# Patient Record
Sex: MALE | Race: BLACK OR AFRICAN AMERICAN | Employment: UNEMPLOYED | URBAN - METROPOLITAN AREA
[De-identification: names, ages, dates, MRNs, and addresses within clinical notes are randomized per-mention and may not be internally consistent; named-entity substitution may affect disease eponyms.]

---

## 2018-08-26 ENCOUNTER — HOSPITAL ENCOUNTER (EMERGENCY)
Age: 18
Discharge: HOME | End: 2018-08-26
Attending: EMERGENCY MEDICINE
Payer: COMMERCIAL

## 2018-08-26 VITALS
RESPIRATION RATE: 16 BRPM | DIASTOLIC BLOOD PRESSURE: 66 MMHG | SYSTOLIC BLOOD PRESSURE: 108 MMHG | TEMPERATURE: 98.3 F | HEART RATE: 75 BPM | OXYGEN SATURATION: 98 %

## 2018-08-26 DIAGNOSIS — R56.9 SEIZURE (HCC): Primary | ICD-10-CM

## 2018-08-26 LAB
CALCIUM IONIZED: 1.21 MMOL/L (ref 1.12–1.32)
CO2: 26 MMOL/L (ref 21–32)
GFR AFRICAN AMERICAN: >60
GFR NON-AFRICAN AMERICAN: >60
GLUCOSE BLD-MCNC: 91 MG/DL (ref 70–99)
LACTATE: 2.93 MMOL/L (ref 0.4–2)
PERFORMED ON: ABNORMAL
PERFORMED ON: ABNORMAL
POC ANION GAP: 11 (ref 10–20)
POC BUN: 22 MG/DL (ref 7–18)
POC CHLORIDE: 106 MMOL/L (ref 99–110)
POC CREATININE: 1.2 MG/DL (ref 0.9–1.3)
POC POTASSIUM: 4.1 MMOL/L (ref 3.5–5.1)
POC SAMPLE TYPE: ABNORMAL
POC SAMPLE TYPE: ABNORMAL
POC SODIUM: 143 MMOL/L (ref 136–145)

## 2018-08-26 PROCEDURE — 96360 HYDRATION IV INFUSION INIT: CPT

## 2018-08-26 PROCEDURE — 2580000003 HC RX 258: Performed by: EMERGENCY MEDICINE

## 2018-08-26 PROCEDURE — 99284 EMERGENCY DEPT VISIT MOD MDM: CPT

## 2018-08-26 PROCEDURE — 83605 ASSAY OF LACTIC ACID: CPT

## 2018-08-26 PROCEDURE — 80047 BASIC METABLC PNL IONIZED CA: CPT

## 2018-08-26 RX ORDER — 0.9 % SODIUM CHLORIDE 0.9 %
1000 INTRAVENOUS SOLUTION INTRAVENOUS ONCE
Status: COMPLETED | OUTPATIENT
Start: 2018-08-26 | End: 2018-08-26

## 2018-08-26 RX ADMIN — SODIUM CHLORIDE 1000 ML: 9 INJECTION, SOLUTION INTRAVENOUS at 18:51

## 2018-08-26 NOTE — ED PROVIDER NOTES
[address] ENCOUNTER          ATTENDING PHYSICIAN NOTE       Date of evaluation: 8/26/2018    Chief Complaint     Seizures (squad reports that bystanders reported that patient had a seizure for 20 minutes/pt was a local rec center/ Pt unable to tell us his birthday/age/history)      History of Present Illness     Soledad Colón is a 19 y.o. male with a history of seizures who presents after apparently having a seizure [place] the rec center. Bystanders apparently reported that it went on for about 20 minutes and the patient here is unable to give us any history and refuses to give any information. Review of Systems     Review of Systems   Unable to perform ROS: Patient nonverbal       Past Medical, Surgical, Family, and Social History     He has no past medical history on file. He has no past surgical history on file. His family history is not on file. He     Medications     Previous Medications    No medications on file       Allergies     He has No Known Allergies. Physical Exam     INITIAL VITALS: BP: 108/66, Temp: 98.3 °F (36.8 °C), Pulse: 75, Resp: 16, SpO2: 98 %   Physical Exam   Constitutional: He appears well-developed and well-nourished. No distress. Eyes: Pupils are equal, round, and reactive to light. EOM are normal.   Cardiovascular: Normal rate and regular rhythm. Pulmonary/Chest: Effort normal and breath sounds normal. He has no rales. Abdominal: Soft. Bowel sounds are normal. He exhibits no distension. There is no tenderness. Neurological:   Somnolent/post-ictal   Skin: Skin is warm and dry. He is not diaphoretic. Nursing note and vitals reviewed.       Diagnostic Results     LABS:   Results for orders placed or performed during the hospital encounter of 08/26/18   POCT Venous   Result Value Ref Range    POC Sodium 143 136 - 145 mmol/L    POC Potassium 4.1 3.5 - 5.1 mmol/L    POC Chloride 106 99 - 110 mmol/L    CO2 26 21 - 32 mmol/L    POC Anion Gap 11 10 - 20    POC Glucose 91 70 - 99 mg/dl    POC BUN 22 (H) 7 - 18 mg/dL    POC Creatinine 1.2 0.9 - 1.3 mg/dL    GFR Non-African American >60 >60    GFR African American >60     Calcium, Ion 1.21 1.12 - 1.32 mmol/L    Sample Type STEVIE     Performed on SEE BELOW    POCT Venous   Result Value Ref Range    Lactate 2.93 (H) 0.40 - 2.00 mmol/L    Sample Type STEVIE     Performed on SEE BELOW        RECENT VITALS:  BP: 108/66, Temp: 98.3 °F (36.8 °C), Pulse: 75, Resp: 16, SpO2: 98 %       ED Course     Nursing Notes, Past Medical Hx, Past Surgical Hx, Social Hx, Allergies, and Family Hx were reviewed. The patient was given the following medications:  Orders Placed This Encounter   Medications    0.9 % sodium chloride bolus       CONSULTS:  None    MEDICAL Von Martin / JONE / Bhupinder Malissa is a 19 y.o. male Presenting after a seizure. He finally awakened after being postictal for a period of time and was answering all questions appropriately and back to baseline and feeling well with no complaints. Family arrived to show us what medications he was on and reports that he has been compliantly think he was in the heat today and that sort may have set off his seizure. He has an appointment coming up with his neurologist in October and I have advised that he keep without appointment.     Clinical Impression     Seizure    Disposition     PATIENT REFERRED TO:  PCP    DISCHARGE MEDICATIONS:  New Prescriptions    No medications on file       [address] [facility] MD Amando  08/26/18 [id number]

## 2018-08-27 NOTE — ED NOTES
Peripheral IV removed without complication. Bleeding controlled and bandage applied. Patient tolerated well.       Casper Hammer RN  08/26/18 [id number]

## 2019-06-14 ENCOUNTER — APPOINTMENT (OUTPATIENT)
Dept: CT IMAGING | Age: 19
End: 2019-06-14
Payer: COMMERCIAL

## 2019-06-14 ENCOUNTER — HOSPITAL ENCOUNTER (EMERGENCY)
Age: 19
Discharge: HOME | End: 2019-06-15
Attending: EMERGENCY MEDICINE
Payer: COMMERCIAL

## 2019-06-14 VITALS
DIASTOLIC BLOOD PRESSURE: 75 MMHG | RESPIRATION RATE: 15 BRPM | WEIGHT: 166 LBS | TEMPERATURE: 98 F | OXYGEN SATURATION: 100 % | HEART RATE: 61 BPM | SYSTOLIC BLOOD PRESSURE: 110 MMHG

## 2019-06-14 DIAGNOSIS — R56.9 SEIZURE (HCC): Primary | ICD-10-CM

## 2019-06-14 DIAGNOSIS — R68.89: ICD-10-CM

## 2019-06-14 DIAGNOSIS — Z79.899 ON VALPROIC ACID THERAPY: ICD-10-CM

## 2019-06-14 LAB
AMPHETAMINE SCREEN, URINE: NORMAL
ANION GAP SERPL CALCULATED.3IONS-SCNC: 13 MMOL/L (ref 3–16)
BARBITURATE SCREEN URINE: NORMAL
BENZODIAZEPINE SCREEN, URINE: NORMAL
BUN BLDV-MCNC: 21 MG/DL (ref 7–20)
CALCIUM SERPL-MCNC: 9.8 MG/DL (ref 8.3–10.6)
CANNABINOID SCREEN URINE: NORMAL
CHLORIDE BLD-SCNC: 104 MMOL/L (ref 99–110)
CO2: 25 MMOL/L (ref 21–32)
COCAINE METABOLITE SCREEN URINE: NORMAL
CREAT SERPL-MCNC: 0.9 MG/DL (ref 0.9–1.3)
GFR AFRICAN AMERICAN: >60
GFR NON-AFRICAN AMERICAN: >60
GLUCOSE BLD-MCNC: 97 MG/DL (ref 70–99)
Lab: NORMAL
METHADONE SCREEN, URINE: NORMAL
OPIATE SCREEN URINE: NORMAL
OXYCODONE URINE: NORMAL
PH UA: 7
PHENCYCLIDINE SCREEN URINE: NORMAL
POTASSIUM SERPL-SCNC: 4.9 MMOL/L (ref 3.5–5.1)
PROPOXYPHENE SCREEN: NORMAL
SODIUM BLD-SCNC: 142 MMOL/L (ref 136–145)
VALPROIC ACID LEVEL: 8.9 UG/ML (ref 50–100)

## 2019-06-14 PROCEDURE — 96375 TX/PRO/DX INJ NEW DRUG ADDON: CPT

## 2019-06-14 PROCEDURE — 80164 ASSAY DIPROPYLACETIC ACD TOT: CPT

## 2019-06-14 PROCEDURE — 2500000003 HC RX 250 WO HCPCS: Performed by: PHYSICIAN ASSISTANT

## 2019-06-14 PROCEDURE — 80048 BASIC METABOLIC PNL TOTAL CA: CPT

## 2019-06-14 PROCEDURE — 96361 HYDRATE IV INFUSION ADD-ON: CPT

## 2019-06-14 PROCEDURE — 2580000003 HC RX 258: Performed by: PHYSICIAN ASSISTANT

## 2019-06-14 PROCEDURE — 70450 CT HEAD/BRAIN W/O DYE: CPT

## 2019-06-14 PROCEDURE — 99284 EMERGENCY DEPT VISIT MOD MDM: CPT

## 2019-06-14 PROCEDURE — 80307 DRUG TEST PRSMV CHEM ANLYZR: CPT

## 2019-06-14 PROCEDURE — 6360000002 HC RX W HCPCS: Performed by: PHYSICIAN ASSISTANT

## 2019-06-14 PROCEDURE — 96365 THER/PROPH/DIAG IV INF INIT: CPT

## 2019-06-14 PROCEDURE — 80183 DRUG SCRN QUANT OXCARBAZEPIN: CPT

## 2019-06-14 RX ORDER — PALIPERIDONE 3 MG/1
1 TABLET, EXTENDED RELEASE ORAL NIGHTLY
COMMUNITY
Start: 2019-06-06

## 2019-06-14 RX ORDER — DIVALPROEX SODIUM 500 MG/1
4 TABLET, EXTENDED RELEASE ORAL EVERY MORNING
COMMUNITY
Start: 2019-06-06

## 2019-06-14 RX ORDER — OXCARBAZEPINE 300 MG/1
2 TABLET, FILM COATED ORAL 2 TIMES DAILY
COMMUNITY
Start: 2019-06-06

## 2019-06-14 RX ORDER — 0.9 % SODIUM CHLORIDE 0.9 %
1000 INTRAVENOUS SOLUTION INTRAVENOUS ONCE
Status: COMPLETED | OUTPATIENT
Start: 2019-06-14 | End: 2019-06-14

## 2019-06-14 RX ORDER — MELATONIN
1 2 TIMES DAILY
COMMUNITY
Start: 2019-06-06

## 2019-06-14 RX ORDER — LORAZEPAM 2 MG/ML
0.5 INJECTION INTRAMUSCULAR ONCE
Status: COMPLETED | OUTPATIENT
Start: 2019-06-14 | End: 2019-06-14

## 2019-06-14 RX ADMIN — LORAZEPAM 0.5 MG: 2 INJECTION INTRAMUSCULAR; INTRAVENOUS at 18:34

## 2019-06-14 RX ADMIN — VALPROATE SODIUM 1500 MG: 100 INJECTION, SOLUTION INTRAVENOUS at 20:00

## 2019-06-14 RX ADMIN — SODIUM CHLORIDE 1000 ML: 9 INJECTION, SOLUTION INTRAVENOUS at 18:34

## 2019-06-14 ASSESSMENT — ENCOUNTER SYMPTOMS
RHINORRHEA: 0
SHORTNESS OF BREATH: 0
VOMITING: 0
SORE THROAT: 0
NAUSEA: 0
ABDOMINAL PAIN: 0
COUGH: 0

## 2019-06-14 NOTE — ED PROVIDER NOTES
ED Attending Attestation Note     Date of evaluation: 6/14/2019    This patient was seen by the advance practice provider. I have seen and examined the patient, agree with the workup, evaluation, management and diagnosis. The care plan has been discussed. My assessment reveals a generally well-appearing young gentleman, in no acute distress. He is sleepy, but arousable to loud vocal and light physical stimuli, at which time he is able to answer questions with simple sentences. He moves all extremities equally. He does appear postictal.  According to EMS, he had a witnessed seizure on the basketball court. According to a family member at the bedside, his last seizure was about a year ago.        Erlinda Alicea MD  06/14/19 [id number]

## 2019-06-14 NOTE — ED PROVIDER NOTES
[address] ENCOUNTER          PHYSICIAN ASSISTANT NOTE       Date of evaluation: 6/14/2019    Chief Complaint     Seizures      History of Present Illness     Carlos Andrews is a 22 y.o. male, with a history of seizure disorder, who presents to the ED via squad after reportedly having had a seizure while on the basketball court just prior to arrival.  Upon the paramedics arrival, he was reportedly postictal, was not given any medication en route. Here he is sleeping but easily aroused to verbal stimulation. Does not recall what he was doing when he had his seizure. He is a relatively poor historian,  answers \"I don't know\" to most questions. His only complaint is a generalized headache. States he has been taking his medications although once his father arrives, he seems skeptical.  The patient denies pain in the extremities, neck and back. Has otherwise been well without fever, chills, nausea and vomiting. No changes in urinary or bowel habits. No chest pain or shortness of breath. He otherwise has no complaints. Review of Systems     Review of Systems   Constitutional: Negative for chills and fever. HENT: Negative for congestion, rhinorrhea and sore throat. Respiratory: Negative for cough and shortness of breath. Cardiovascular: Negative for chest pain and palpitations. Gastrointestinal: Negative for abdominal pain, nausea and vomiting. Genitourinary: Negative for decreased urine volume and difficulty urinating. Musculoskeletal: Negative for arthralgias and myalgias. Skin: Negative for rash and wound. Neurological: Positive for seizures and headaches. Negative for dizziness, weakness, light-headedness and numbness. All other systems reviewed and are negative.       Past Medical, Surgical, Family, and Social History     He has a past medical history of ADHD (attention deficit hyperactivity disorder), Cognitive disorder, Depression, Oppositional intact. No lesion, no petechiae and no rash noted. Psychiatric: He has a normal mood and affect. His behavior is normal.   Vitals reviewed. ED Course     Nursing Notes, Past Medical Hx,Past Surgical Hx, Social Hx, Allergies, and Family Hx were reviewed. The patient was given the following medications:  Orders Placed This Encounter   Medications    0.9 % sodium chloride bolus    LORazepam (ATIVAN) injection 0.5 mg    valproate (DEPACON) 1,500 mg in dextrose 5 % 100 mL IVPB       CONSULTS:  None    MEDICAL Usmansean Liu / Kayode Mary / Nelsonismael Cliff is a 22 y.o. male presents after having had a reported seizure, still drowsy upon arrival and difficult to get a history from. He has no neurologic deficits, no apparent injuries. IV access was established. He was given Ativan 0.5 mg IV as well as IV fluids and placed on seizure precautions. Labs included renal panel with a BUN of 21, it is otherwise unremarkable. Urine drug screen is negative. Valproic acid level is low at 8.9. Trileptal level is pending. The patient was given a loading dose of valproate IV after discussion with the pharmacist.  The patient remained comfortable throughout his stay. Upon reassessment he was still drowsy but easily aroused, reports his headache is resolved and was feeling hungry. Was given a lunch box which he tolerated without difficulty. States again that he is taking his medications as directed. The patient was informed of his low Depakote level and he was encouraged to take his medications as directed. He will follow up with his neurologist for additional management or return to the ED for any worsening symptoms. This patient was also evaluated by the attending physician. All care plans were discussed and agreed upon. Clinical Impression     1. Seizure ([address]    2. Drug level decreased compared with prior measurement    3.  On valproic acid therapy        Disposition     PATIENT REFERRED TO:  Hussain , DMD  [address] [facility]  [phone]    Call in 3 days  for recheck appointment      DISCHARGE MEDICATIONS:  Current Discharge Medication List          DISPOSITION  Discharged     Shane Gonzalez [place]  06/14/19 [id number]

## 2019-06-15 NOTE — ED NOTES
Patient prepared for and ready to be discharged. Patient discharged at this time in no acute distress after verbalizing understanding of discharge instructions. Patient left after receiving After Visit Summary instructions.         Darcy Daily RN  06/15/19 0000

## 2019-06-19 LAB — OXCARBAZEPINE: <1 UG/ML (ref 3–35)

## 2020-01-04 ENCOUNTER — HOSPITAL ENCOUNTER (EMERGENCY)
Age: 20
Discharge: HOME | End: 2020-01-04
Attending: EMERGENCY MEDICINE
Payer: COMMERCIAL

## 2020-01-04 VITALS
SYSTOLIC BLOOD PRESSURE: 120 MMHG | OXYGEN SATURATION: 100 % | TEMPERATURE: 98.2 F | RESPIRATION RATE: 22 BRPM | HEART RATE: 72 BPM | DIASTOLIC BLOOD PRESSURE: 69 MMHG

## 2020-01-04 LAB
ANION GAP SERPL CALCULATED.3IONS-SCNC: 10 MMOL/L (ref 3–16)
BUN BLDV-MCNC: 18 MG/DL (ref 7–20)
CALCIUM SERPL-MCNC: 9.5 MG/DL (ref 8.3–10.6)
CHLORIDE BLD-SCNC: 99 MMOL/L (ref 99–110)
CO2: 29 MMOL/L (ref 21–32)
CREAT SERPL-MCNC: 0.8 MG/DL (ref 0.9–1.3)
GFR AFRICAN AMERICAN: >60
GFR NON-AFRICAN AMERICAN: >60
GLUCOSE BLD-MCNC: 82 MG/DL (ref 70–99)
POTASSIUM REFLEX MAGNESIUM: 4.1 MMOL/L (ref 3.5–5.1)
SODIUM BLD-SCNC: 138 MMOL/L (ref 136–145)
VALPROIC ACID LEVEL: 8.6 UG/ML (ref 50–100)

## 2020-01-04 PROCEDURE — 99284 EMERGENCY DEPT VISIT MOD MDM: CPT

## 2020-01-04 PROCEDURE — 80048 BASIC METABOLIC PNL TOTAL CA: CPT

## 2020-01-04 PROCEDURE — 80164 ASSAY DIPROPYLACETIC ACD TOT: CPT

## 2020-01-05 NOTE — ED PROVIDER NOTES
[address] [facility] [address] [facility]          ATTENDING PHYSICIAN NOTE       Date of evaluation: 1/4/2020    Chief Complaint     Seizures      History of Present Illness     Carlos Wang is a 22 y.o. male with longstanding history of seizures who presents after a seizure. The patient was on the telephone getting ready to order some pizza and talking about how long it had been since he had had a seizure when he suddenly had a seizure. He was hoping to start driving again because it had been a decent amount of time since his last seizure. EMS was called to the scene and found his blood sugar to be in the 80s and administered 5 mg of diazepam with cessation of the seizure activity. The patient arrives postictal and unable to give any history at this moment. Review of Systems     Review of Systems   Unable to perform ROS: Mental status change (post ictal)       Past Medical, Surgical, Family, and Social History     He has a past medical history of ADHD (attention deficit hyperactivity disorder), Cognitive disorder, Depression, Oppositional defiant disorder, and Symptomatic localization-related epilepsy ([facility] [address] He has no past surgical history on file. His family history is not on file. He reports that he has never smoked. He has never used smokeless tobacco. He reports that he does not drink alcohol.     Medications     Current Discharge Medication List      CONTINUE these medications which have NOT CHANGED    Details   paliperidone (INVEGA) 3 MG extended release tablet Take 1 tablet by mouth nightly      Cholecalciferol (VITAMIN D3) 1000 units TABS Take 1 tablet by mouth 2 times daily      divalproex (DEPAKOTE ER) 500 MG extended release tablet Take 4 tablets by mouth every morning      OXcarbazepine (TRILEPTAL) 300 MG tablet Take 2 tablets by mouth 2 times daily      diazePAM (DIASTAT ADULT RE) Place 12.5 mg rectally For seizures lasting longer than 5 minutes or more than 1 seizure in 8 following medications:  No orders of the defined types were placed in this encounter. CONSULTS:  None    MEDICAL DECISIONMAKING / ASSESSMENT / Salenawally Leon is a 22 y.o. male presenting with a seizure with known history of seizures who is on 2 seizure medications. Depakote level is significantly subtherapeutic which has been on previous ED visits for seizures as well. The patient will need to follow-up with his neurologist.  He wanted to be cleared for driving but I am not able to do that  and he will need to see his neurologist for that. Clinical Impression     1.  Seizure [facility])        Disposition     PATIENT REFERRED TO:  your neurologist    Call   to schedule followup appointment      DISCHARGE MEDICATIONS:  Current Discharge Medication List          DISPOSITION         Leisa Lubin MD  01/04/20 9624

## 2020-01-05 NOTE — ED NOTES
Bed: A11-11  Expected date:   Expected time:   Means of arrival:   Comments:  [address] [facility]  01/04/20 2111

## 2020-01-05 NOTE — ED NOTES
20 g iv removed from right ac, catheter intact, bleeding controlled.       Jacob Alcantar RN  01/04/20 5963 shortness of breath x 1 hour

## 2020-05-08 ENCOUNTER — HOSPITAL ENCOUNTER (EMERGENCY)
Age: 20
Discharge: HOME | End: 2020-05-08
Payer: COMMERCIAL

## 2020-05-08 ENCOUNTER — APPOINTMENT (OUTPATIENT)
Dept: GENERAL RADIOLOGY | Age: 20
End: 2020-05-08
Payer: COMMERCIAL

## 2020-05-08 VITALS
RESPIRATION RATE: 16 BRPM | DIASTOLIC BLOOD PRESSURE: 69 MMHG | SYSTOLIC BLOOD PRESSURE: 126 MMHG | TEMPERATURE: 98 F | HEART RATE: 64 BPM | OXYGEN SATURATION: 98 %

## 2020-05-08 PROCEDURE — 6360000002 HC RX W HCPCS: Performed by: PHYSICIAN ASSISTANT

## 2020-05-08 PROCEDURE — 99283 EMERGENCY DEPT VISIT LOW MDM: CPT

## 2020-05-08 PROCEDURE — 96372 THER/PROPH/DIAG INJ SC/IM: CPT

## 2020-05-08 PROCEDURE — 73630 X-RAY EXAM OF FOOT: CPT

## 2020-05-08 PROCEDURE — 73610 X-RAY EXAM OF ANKLE: CPT

## 2020-05-08 RX ORDER — IBUPROFEN 800 MG/1
800 TABLET ORAL EVERY 8 HOURS PRN
Qty: 30 TABLET | Refills: 0 | Status: SHIPPED | OUTPATIENT
Start: 2020-05-08

## 2020-05-08 RX ORDER — KETOROLAC TROMETHAMINE 30 MG/ML
15 INJECTION, SOLUTION INTRAMUSCULAR; INTRAVENOUS ONCE
Status: COMPLETED | OUTPATIENT
Start: 2020-05-08 | End: 2020-05-08

## 2020-05-08 RX ADMIN — KETOROLAC TROMETHAMINE 15 MG: 30 INJECTION, SOLUTION INTRAMUSCULAR at 14:53

## 2020-05-08 ASSESSMENT — ENCOUNTER SYMPTOMS
NAUSEA: 0
COUGH: 0
DIARRHEA: 0
SHORTNESS OF BREATH: 0
BACK PAIN: 0
VOMITING: 0
ABDOMINAL PAIN: 0

## 2020-05-08 ASSESSMENT — PAIN DESCRIPTION - ORIENTATION: ORIENTATION: RIGHT

## 2020-05-08 ASSESSMENT — PAIN DESCRIPTION - PAIN TYPE: TYPE: ACUTE PAIN

## 2020-05-08 ASSESSMENT — PAIN SCALES - GENERAL
PAINLEVEL_OUTOF10: 9
PAINLEVEL_OUTOF10: 9

## 2020-05-08 ASSESSMENT — PAIN DESCRIPTION - FREQUENCY: FREQUENCY: CONTINUOUS

## 2020-05-08 ASSESSMENT — PAIN DESCRIPTION - DESCRIPTORS: DESCRIPTORS: ACHING;CONSTANT

## 2020-05-08 ASSESSMENT — PAIN DESCRIPTION - LOCATION: LOCATION: ANKLE

## 2020-05-08 NOTE — ED NOTES
Patient prepared for and ready to be discharged. Patient discharged at this time in no acute distress after verbalizing understanding of discharge instructions. Patient left after receiving After Visit Summary instructions.         Manas Hankins RN  05/08/20 5160

## 2020-05-08 NOTE — ED NOTES
[facility]: [facility]  Expected date:   Expected time:   Means of arrival:   Comments:  Medic 23 ankle injury yesterday     Maria Isabel Rondon RN  05/08/20 5762

## 2020-05-08 NOTE — ED PROVIDER NOTES
[address] [facility]          PHYSICIAN ASSISTANT NOTE       Date of evaluation: 5/8/2020    Chief Complaint     Ankle Pain (right)      History of Present Illness     Carlos Garcia is a 23 y.o. male who presents to the emergency department due to right ankle pain. Patient states yesterday he was playing basketball and rolled his right ankle after jumping. He states he was able to ambulate after the injury, however it was painful. He states his pain is been steadily increasing since. He rates his current pain as a 5/10 at rest which quickly turns to an 8/10 with any type of movement or ambulation. He is still able to ambulate today, however it is painful. He denies numbness, tingling, or weakness. He has not taken any ibuprofen or Tylenol and attempts to relieve his pain. Review of Systems     Review of Systems   Constitutional: Negative for activity change, chills and fever. HENT: Negative for congestion. Eyes: Negative for visual disturbance. Respiratory: Negative for cough and shortness of breath. Cardiovascular: Negative for chest pain. Gastrointestinal: Negative for abdominal pain, diarrhea, nausea and vomiting. Genitourinary: Negative for difficulty urinating. Musculoskeletal: Negative for back pain, myalgias and neck pain. Right ankle pain   Skin: Negative for wound. Neurological: Negative for dizziness, weakness and numbness. Psychiatric/Behavioral: Negative for suicidal ideas. Past Medical, Surgical, Family, and Social History     He has a past medical history of ADHD (attention deficit hyperactivity disorder), Cognitive disorder, Depression, Oppositional defiant disorder, and Symptomatic localization-related epilepsy ([facility] [address] He has no past surgical history on file. His family history is not on file. He reports that he has never smoked.  He has never used smokeless tobacco. He reports that he does not drink Mental Status: He is alert and oriented to person, place, and time. Sensory: No sensory deficit. Psychiatric:         Mood and Affect: Mood normal.         Behavior: Behavior normal.         Diagnostic Results     RADIOLOGY:  XR ANKLE RIGHT (MIN 3 VIEWS)   Final Result      No acute osseous abnormality. .      XR FOOT RIGHT (MIN 3 VIEWS)   Final Result      No acute osseous abnormality. Wilder Castaneda LABS:   No results found for this visit on 05/08/20. RECENT VITALS:  BP: 126/69, Temp: 98 °F (36.7 °C), Pulse: 64, Resp: 16, SpO2: 98 %     ED Course     Nursing Notes, Past Medical Hx,Past Surgical Hx, Social Hx, Allergies, and Family Hx were reviewed. The patient was given the following medications:  Orders Placed This Encounter   Medications    ketorolac (TORADOL) injection 15 mg    ibuprofen (ADVIL;MOTRIN) 800 MG tablet     Sig: Take 1 tablet by mouth every 8 hours as needed for Pain     Dispense:  30 tablet     Refill:  0       CONSULTS:  None    MEDICAL Jaimie Meals / ASSESSMENT / Janie Dayana is a 23 y.o. male who presents the emergency department the right ankle pain. Vital signs were stable on presentation remained stable throughout his stay. Thorough history and physical exam was performed as detailed above. Patient presents the emergency department due to right ankle pain. He states he was playing basketball yesterday and landed incorrectly on his right ankle. He has had mild pain ever since. He has not taken any ibuprofen or Tylenol and attempts to relieve his pain. He is still able to ambulate, however it is painful. Denies numbness, tingling, or weakness. On physical examination patient has diffuse tenderness throughout lateral medial malleoli. Also mild tenderness at the base of the fifth metatarsal.  No tenderness to palpation throughout the midfoot. No tenderness palpation throughout the digits of the right foot. No distal tibia tenderness.   X-ray of right

## 2020-06-28 ENCOUNTER — HOSPITAL ENCOUNTER (EMERGENCY)
Age: 20
Discharge: HOME | End: 2020-06-28
Attending: EMERGENCY MEDICINE
Payer: COMMERCIAL

## 2020-06-28 VITALS
RESPIRATION RATE: 18 BRPM | DIASTOLIC BLOOD PRESSURE: 68 MMHG | SYSTOLIC BLOOD PRESSURE: 127 MMHG | TEMPERATURE: 98 F | HEART RATE: 87 BPM | WEIGHT: 168.6 LBS | OXYGEN SATURATION: 99 %

## 2020-06-28 PROCEDURE — 99284 EMERGENCY DEPT VISIT MOD MDM: CPT

## 2020-06-28 PROCEDURE — 6370000000 HC RX 637 (ALT 250 FOR IP): Performed by: STUDENT IN AN ORGANIZED HEALTH CARE EDUCATION/TRAINING PROGRAM

## 2020-06-28 RX ORDER — OXCARBAZEPINE 300 MG/1
300 TABLET, FILM COATED ORAL 2 TIMES DAILY
COMMUNITY

## 2020-06-28 RX ORDER — DIVALPROEX SODIUM 500 MG/1
500 TABLET, EXTENDED RELEASE ORAL 2 TIMES DAILY
COMMUNITY

## 2020-06-28 RX ORDER — ACETAMINOPHEN 325 MG/1
650 TABLET ORAL ONCE
Status: COMPLETED | OUTPATIENT
Start: 2020-06-28 | End: 2020-06-28

## 2020-06-28 RX ORDER — ARIPIPRAZOLE 10 MG/1
10 TABLET ORAL DAILY
COMMUNITY

## 2020-06-28 RX ADMIN — ACETAMINOPHEN 650 MG: 325 TABLET ORAL at 22:24

## 2020-06-28 SDOH — HEALTH STABILITY: MENTAL HEALTH: HOW OFTEN DO YOU HAVE A DRINK CONTAINING ALCOHOL?: NEVER

## 2020-06-28 ASSESSMENT — ENCOUNTER SYMPTOMS
BACK PAIN: 0
SHORTNESS OF BREATH: 0
SORE THROAT: 0
COUGH: 0
ABDOMINAL PAIN: 0
VOMITING: 0

## 2020-06-28 ASSESSMENT — PAIN SCALES - GENERAL: PAINLEVEL_OUTOF10: 10

## 2020-08-04 ENCOUNTER — HOSPITAL ENCOUNTER (EMERGENCY)
Age: 20
Discharge: HOME | End: 2020-08-04
Attending: EMERGENCY MEDICINE
Payer: COMMERCIAL

## 2020-08-04 VITALS
SYSTOLIC BLOOD PRESSURE: 107 MMHG | OXYGEN SATURATION: 99 % | HEART RATE: 69 BPM | TEMPERATURE: 98.1 F | RESPIRATION RATE: 16 BRPM | DIASTOLIC BLOOD PRESSURE: 53 MMHG | WEIGHT: 173 LBS

## 2020-08-04 LAB
ANION GAP SERPL CALCULATED.3IONS-SCNC: 8 MMOL/L (ref 3–16)
BASOPHILS ABSOLUTE: 0 K/UL (ref 0–0.2)
BASOPHILS RELATIVE PERCENT: 0.8 %
BUN BLDV-MCNC: 21 MG/DL (ref 7–20)
CALCIUM SERPL-MCNC: 9.8 MG/DL (ref 8.3–10.6)
CHLORIDE BLD-SCNC: 104 MMOL/L (ref 99–110)
CO2: 27 MMOL/L (ref 21–32)
CREAT SERPL-MCNC: 1 MG/DL (ref 0.9–1.3)
EOSINOPHILS ABSOLUTE: 0 K/UL (ref 0–0.6)
EOSINOPHILS RELATIVE PERCENT: 0.5 %
GFR AFRICAN AMERICAN: >60
GFR NON-AFRICAN AMERICAN: >60
GLUCOSE BLD-MCNC: 93 MG/DL (ref 70–99)
HCT VFR BLD CALC: 45.5 % (ref 40.5–52.5)
HEMOGLOBIN: 15.1 G/DL (ref 13.5–17.5)
LYMPHOCYTES ABSOLUTE: 0.8 K/UL (ref 1–5.1)
LYMPHOCYTES RELATIVE PERCENT: 21.4 %
MCH RBC QN AUTO: 28.8 PG (ref 26–34)
MCHC RBC AUTO-ENTMCNC: 33.2 G/DL (ref 31–36)
MCV RBC AUTO: 86.6 FL (ref 80–100)
MONOCYTES ABSOLUTE: 0.2 K/UL (ref 0–1.3)
MONOCYTES RELATIVE PERCENT: 5.6 %
NEUTROPHILS ABSOLUTE: 2.7 K/UL (ref 1.7–7.7)
NEUTROPHILS RELATIVE PERCENT: 71.7 %
PDW BLD-RTO: 14.7 % (ref 12.4–15.4)
PLATELET # BLD: 297 K/UL (ref 135–450)
PMV BLD AUTO: 8 FL (ref 5–10.5)
POTASSIUM REFLEX MAGNESIUM: 4.3 MMOL/L (ref 3.5–5.1)
RBC # BLD: 5.25 M/UL (ref 4.2–5.9)
SODIUM BLD-SCNC: 139 MMOL/L (ref 136–145)
VALPROIC ACID LEVEL: <2.8 UG/ML (ref 50–100)
WBC # BLD: 3.7 K/UL (ref 4–11)

## 2020-08-04 PROCEDURE — 99284 EMERGENCY DEPT VISIT MOD MDM: CPT

## 2020-08-04 PROCEDURE — 6370000000 HC RX 637 (ALT 250 FOR IP): Performed by: PHYSICIAN ASSISTANT

## 2020-08-04 PROCEDURE — 80048 BASIC METABOLIC PNL TOTAL CA: CPT

## 2020-08-04 PROCEDURE — 85025 COMPLETE CBC W/AUTO DIFF WBC: CPT

## 2020-08-04 PROCEDURE — 80183 DRUG SCRN QUANT OXCARBAZEPIN: CPT

## 2020-08-04 PROCEDURE — 80164 ASSAY DIPROPYLACETIC ACD TOT: CPT

## 2020-08-04 RX ORDER — DIVALPROEX SODIUM 500 MG/1
1000 TABLET, EXTENDED RELEASE ORAL DAILY
Status: DISCONTINUED | OUTPATIENT
Start: 2020-08-04 | End: 2020-08-05 | Stop reason: HOSPADM

## 2020-08-04 RX ORDER — OXCARBAZEPINE 300 MG/1
600 TABLET, FILM COATED ORAL ONCE
Status: COMPLETED | OUTPATIENT
Start: 2020-08-04 | End: 2020-08-04

## 2020-08-04 RX ADMIN — DIVALPROEX SODIUM 1000 MG: 500 TABLET, EXTENDED RELEASE ORAL at 20:07

## 2020-08-04 RX ADMIN — OXCARBAZEPINE 600 MG: 300 TABLET, FILM COATED ORAL at 20:07

## 2020-08-04 ASSESSMENT — ENCOUNTER SYMPTOMS
ABDOMINAL PAIN: 0
COLOR CHANGE: 0
DIARRHEA: 0
NAUSEA: 0
SORE THROAT: 0
VOMITING: 0
WHEEZING: 0
ABDOMINAL DISTENTION: 0
EYE PAIN: 0
CHEST TIGHTNESS: 0
RHINORRHEA: 0
COUGH: 0
TROUBLE SWALLOWING: 0
SHORTNESS OF BREATH: 0

## 2020-08-04 NOTE — ED TRIAGE NOTES
Pt arrived to ED after witnessed seizure per EMS. Unclear how long seizure lasted. Pt post ictal upon arrival, responding to painful stimuli but quickly falls back asleep. Unable to give history at this time.

## 2020-08-05 NOTE — ED PROVIDER NOTES
ED Attending Attestation Note     Date of evaluation: 8/4/2020    This patient was seen by the KRISTOFER. I have seen and examined the patient, agree with the workup, evaluation, management and diagnosis. The care plan has been discussed. I was present for any procedures performed in the KRISTOFER's note and have made edits to the note where appropriate. My assessment reveals 23 y.o. male with history of seizure disorder presenting for a seizure while playing basketball. Seizure resolved spontaneously prior to EMS arrival, did not receive medications. Is slightly postictal on my examination, but awakens and is appropriately interactive. Moving all extremities spontaneously. No external evidence of trauma and denies pain or trauma when asked.   Suspect subtherapeutic antiepileptic levels as his cause as this is frequently been the case in the past.       Vandana Gallo MD  08/04/20 3336

## 2020-08-05 NOTE — ED PROVIDER NOTES
[facility]          PHYSICIAN ASSISTANT NOTE       Date of evaluation: 8/4/2020    Chief Complaint     Seizures      History of Present Illness     Carlos Flores is a 23 y.o. male with a past medical history as noted below who presents to the Emergency Department with a complaint of status post seizures. Patient has a history of seizure disorder, currently on 2 antiepileptic medication regimens. The patient apparently had a seizure while playing basketball, witnessed by friends. EMS was activated on EMS arrival, the patient was no longer seizing, but was postictal and semiconscious. He continued to have improving mental status during EMS transport. He received no interventions from EMS. On arrival to the emergency department, the patient is confused, but able to provide minimal history. He is aware of what medications he takes for seizure-like activity, and reports that he has been compliant with his medications. However, when I called the patient's mother, who helped to provide a history, she reports that she is concerned that he has not been taking his medication regimen as prescribed and although he may have taken a dose of the medication today, he likely has missed several doses over the past several days and she is concerned that this is the reason for his seizure activity. He has had seizures since he was a child. No trauma related to seizures. He is not certain when his last seizure-like activity was. No visual acuity changes. No recent infectious symptoms. No recent trauma. The patient's mother reports that the patient has been occasionally wearing his mask while in public and has been attempting social distancing. It is unknown if he has had any exposures to COVID-19 or persons under investigation for the disease. Denies any fevers, chills, sweats or other constitutional symptoms.     Review of Systems     Review of Systems   Constitutional: Negative for chills, diaphoresis, fatigue and fever. HENT: Negative for congestion, postnasal drip, rhinorrhea, sore throat and trouble swallowing. Eyes: Negative for pain and visual disturbance. Respiratory: Negative for cough, chest tightness, shortness of breath and wheezing. Cardiovascular: Negative for chest pain, palpitations and leg swelling. Gastrointestinal: Negative for abdominal distention, abdominal pain, diarrhea, nausea and vomiting. Endocrine: Negative for polydipsia and polyuria. Genitourinary: Negative for dysuria. Musculoskeletal: Negative for myalgias, neck pain and neck stiffness. Skin: Negative for color change, pallor and rash. Neurological: Positive for seizures. Negative for dizziness, weakness, light-headedness and headaches. Hematological: Does not bruise/bleed easily. Psychiatric/Behavioral: Negative for confusion, hallucinations, self-injury and suicidal ideas. All other systems reviewed and are negative. Past Medical, Surgical, Family, and Social History     He has a past medical history of ADHD (attention deficit hyperactivity disorder), Cognitive disorder, Depression, Oppositional defiant disorder, and Symptomatic localization-related epilepsy ([facility] [address] He has no past surgical history on file. His family history is not on file. He reports that he has never smoked. He has never used smokeless tobacco. He reports that he does not drink alcohol.     Medications     Discharge Medication List as of 8/4/2020  9:49 PM      CONTINUE these medications which have NOT CHANGED    Details   ibuprofen (ADVIL;MOTRIN) 800 MG tablet Take 1 tablet by mouth every 8 hours as needed for Pain, Disp-30 tablet, R-0Print      paliperidone (INVEGA) 3 MG extended release tablet Take 1 tablet by mouth nightlyHistorical Med      Cholecalciferol (VITAMIN D3) 1000 units TABS Take 1 tablet by mouth 2 times dailyHistorical Med      divalproex (DEPAKOTE ER) 500 MG extended release tablet Take 4 tablets by mouth every morningHistorical Med      OXcarbazepine (TRILEPTAL) 300 MG tablet Take 2 tablets by mouth 2 times dailyHistorical Med      diazePAM (DIASTAT ADULT RE) Place 12.5 mg rectally For seizures lasting longer than 5 minutes or more than 1 seizure in 8 hours. Historical Med      ARIPiprazole (ABILIFY) 10 MG tablet Take 10 mg by mouth daily             Allergies     He is allergic to methadone and methylphenidate hcl. Physical Exam     INITIAL VITALS: BP: 119/65, Temp: 98.1 °F (36.7 °C), Pulse: 69, Resp: 16, SpO2: 99 %  Physical Exam  Vitals signs and nursing note reviewed. Constitutional:       General: He is not in acute distress. Appearance: He is well-developed. He is not diaphoretic. HENT:      Head: Normocephalic and atraumatic. Eyes:      General: No visual field deficit. Pupils: Pupils are equal, round, and reactive to light. Neck:      Musculoskeletal: Normal range of motion and neck supple. Vascular: No JVD. Cardiovascular:      Rate and Rhythm: Normal rate and regular rhythm. Pulmonary:      Effort: Pulmonary effort is normal. No respiratory distress. Breath sounds: Normal breath sounds. No wheezing or rales. Chest:      Chest wall: No tenderness. Abdominal:      General: There is no distension. Palpations: Abdomen is soft. Tenderness: There is no abdominal tenderness. Musculoskeletal: Normal range of motion. Skin:     General: Skin is warm and dry. Coloration: Skin is not pale. Findings: No erythema or rash. Neurological:      Mental Status: He is alert. He is disoriented. GCS: GCS eye subscore is 4. GCS verbal subscore is 4. GCS motor subscore is 6. Cranial Nerves: No cranial nerve deficit, dysarthria or facial asymmetry. Sensory: No sensory deficit. Motor: No weakness, tremor, atrophy or seizure activity.       Coordination: Coordination normal.         Diagnostic Results     RADIOLOGY:  No orders to display       LABS:   Results for orders placed or performed during the hospital encounter of 08/04/20   Valproic acid level, total   Result Value Ref Range    Valproic Acid Lvl <2.8 (L) 50.0 - 100.0 ug/mL   Basic Metabolic Panel w/ Reflex to MG   Result Value Ref Range    Sodium 139 136 - 145 mmol/L    Potassium reflex Magnesium 4.3 3.5 - 5.1 mmol/L    Chloride 104 99 - 110 mmol/L    CO2 27 21 - 32 mmol/L    Anion Gap 8 3 - 16    Glucose 93 70 - 99 mg/dL    BUN 21 (H) 7 - 20 mg/dL    CREATININE 1.0 0.9 - 1.3 mg/dL    GFR Non-African American >60 >60    GFR African American >60 >60    Calcium 9.8 8.3 - 10.6 mg/dL   CBC auto differential   Result Value Ref Range    WBC 3.7 (L) 4.0 - 11.0 K/uL    RBC 5.25 4.20 - 5.90 M/uL    Hemoglobin 15.1 13.5 - 17.5 g/dL    Hematocrit 45.5 40.5 - 52.5 %    MCV 86.6 80.0 - 100.0 fL    MCH 28.8 26.0 - 34.0 pg    MCHC 33.2 31.0 - 36.0 g/dL    RDW 14.7 12.4 - 15.4 %    Platelets 841 983 - 423 K/uL    MPV 8.0 5.0 - 10.5 fL    Neutrophils % 71.7 %    Lymphocytes % 21.4 %    Monocytes % 5.6 %    Eosinophils % 0.5 %    Basophils % 0.8 %    Neutrophils Absolute 2.7 1.7 - 7.7 K/uL    Lymphocytes Absolute 0.8 (L) 1.0 - 5.1 K/uL    Monocytes Absolute 0.2 0.0 - 1.3 K/uL    Eosinophils Absolute 0.0 0.0 - 0.6 K/uL    Basophils Absolute 0.0 0.0 - 0.2 K/uL       ED BEDSIDE ULTRASOUND:  N/A    RECENT VITALS:  BP: (!) 107/53, Temp: 98.1 °F (36.7 °C), Pulse: 69, Resp: 16, SpO2: 99 %     Procedures     N/A    ED Course     Nursing Notes, Past Medical Hx,Past Surgical Hx, Social Hx, Allergies, and Family Hx were reviewed.     The patient was given the following medications:  Orders Placed This Encounter   Medications    OXcarbazepine (TRILEPTAL) tablet 600 mg    divalproex (DEPAKOTE ER) extended release tablet 1,000 mg       CONSULTS:  None    MEDICAL DECISION MAKING / ASSESSMENT / Abdimiroslava Ashby is admitted to the Emergency Department for evaluation of his chief complaint as described in the history of present illness. Complete history and physical was performed by me and my attending. Nursing notes, past medical history, surgical history, family history and social history were reviewed and addressed in the HPI. Sheila Samson is a 23 y.o. male who presents to the emergency department with a complaint of seizure activity. Patient had a witnessed seizure this evening, ultimately being transported by EMS to the emergency department for evaluation. Although the patient reports that he has been taking his medications on a regular basis, and his family brings in the question whether or not the patient is truly been taking his medication or not. On arrival to the emergency department, the patient continues to demonstrate postictal behavior with some mild confusion, but the patient is conscious and somewhat oriented. He is able to answer questions, but when asked about his medication, he reports that he just takes \"seizure medication. \"  Physical examination demonstrates no significant gross neurological deficits, just a mild confusion resulting from the postictal period. He does not have any significant trauma to his tongue. There is a mild amount of incontinence. On evaluation, the patient demonstrates a normal CBC, unremarkable BMP and his Depakote level is below the detectable threshold, indicating that the patient has not been taking his medication as prescribed. His oxcarbazepine level is still in process and will not be completed until tomorrow. This level can be used by his primary care physician or neurologist to further adjust his medications as necessary. I spent an extensive amount of time coaching the patient on the importance of taking his antiepileptic medications.   The patient was loaded with Trileptal and Depakote in the emergency department but to get him back to therapeutic levels and he will continue his prescriptions as provided by his neurologist.  I strongly recommended that the patient call his neurologist tomorrow for follow-up to be set up at some point this week either by telemedicine or by office visit. The patient cleared his postictal period, became lucid, conscious awake and oriented x4 and was stable for discharge. I discussed this plan at length the patient who verbalizes understanding and is in agreement. The patient is currently stable and will be discharged home for continued self-care. Please see patient's AVS for additional discharge instructions. The patient was seen and evaluated by myself and the attending Salena Dhillon MD, who agrees with my assessment, treatment and plan. Clinical Impression     1. Seizure ([address]    2.  Poor compliance with medication        Disposition     PATIENT REFERRED TO:  [place] [id number]  [phone]    Schedule an appointment as soon as possible for a visit       Vanessa Bolivar MD  [facility]. [address] [address] Emergency Department  [address] [place] [address]  [phone]  Go to   If symptoms worsen      DISCHARGE MEDICATIONS:  Discharge Medication List as of 8/4/2020  9:49 PM          DISPOSITION Decision To Discharge 08/04/2020 09:44:44 PM       [address] [place] [address]  08/04/20 [id number]

## 2020-08-05 NOTE — ED NOTES
Patient prepared for and ready to be discharged. Dressed in clothes and given belongings. IV removed, pt tolerated well, no complications. Patient discharged at this time in no acute distress after he verbalized understanding of discharge instructions. Reviewed medications, and when to return to the ED with patient. Encouraged follow up with PCP  Patient walked to lobby, Family to take patient home.        Allanesha Smith-Narcisse, RN  08/04/20 6210

## 2020-08-07 LAB — OXCARBAZEPINE: <1 UG/ML (ref 3–35)

## 2020-08-23 ENCOUNTER — HOSPITAL ENCOUNTER (EMERGENCY)
Age: 20
Discharge: HOME | End: 2020-08-23
Attending: EMERGENCY MEDICINE
Payer: COMMERCIAL

## 2020-08-23 ENCOUNTER — APPOINTMENT (OUTPATIENT)
Dept: GENERAL RADIOLOGY | Age: 20
End: 2020-08-23
Payer: COMMERCIAL

## 2020-08-23 VITALS
DIASTOLIC BLOOD PRESSURE: 97 MMHG | HEART RATE: 70 BPM | HEIGHT: 65 IN | TEMPERATURE: 97.9 F | SYSTOLIC BLOOD PRESSURE: 136 MMHG | OXYGEN SATURATION: 99 % | BODY MASS INDEX: 27.82 KG/M2 | RESPIRATION RATE: 15 BRPM | WEIGHT: 167 LBS

## 2020-08-23 LAB
ANION GAP SERPL CALCULATED.3IONS-SCNC: 11 MMOL/L (ref 3–16)
BASOPHILS ABSOLUTE: 0 K/UL (ref 0–0.2)
BASOPHILS RELATIVE PERCENT: 0.5 %
BUN BLDV-MCNC: 19 MG/DL (ref 7–20)
CALCIUM SERPL-MCNC: 10 MG/DL (ref 8.3–10.6)
CHLORIDE BLD-SCNC: 103 MMOL/L (ref 99–110)
CO2: 22 MMOL/L (ref 21–32)
CREAT SERPL-MCNC: 0.9 MG/DL (ref 0.9–1.3)
EOSINOPHILS ABSOLUTE: 0 K/UL (ref 0–0.6)
EOSINOPHILS RELATIVE PERCENT: 0.8 %
GFR AFRICAN AMERICAN: >60
GFR NON-AFRICAN AMERICAN: >60
GLUCOSE BLD-MCNC: 125 MG/DL (ref 70–99)
HCT VFR BLD CALC: 46.2 % (ref 40.5–52.5)
HEMOGLOBIN: 15.6 G/DL (ref 13.5–17.5)
LYMPHOCYTES ABSOLUTE: 1 K/UL (ref 1–5.1)
LYMPHOCYTES RELATIVE PERCENT: 23 %
MCH RBC QN AUTO: 29 PG (ref 26–34)
MCHC RBC AUTO-ENTMCNC: 33.7 G/DL (ref 31–36)
MCV RBC AUTO: 86 FL (ref 80–100)
MONOCYTES ABSOLUTE: 0.2 K/UL (ref 0–1.3)
MONOCYTES RELATIVE PERCENT: 5.3 %
NEUTROPHILS ABSOLUTE: 3 K/UL (ref 1.7–7.7)
NEUTROPHILS RELATIVE PERCENT: 70.4 %
PDW BLD-RTO: 14.5 % (ref 12.4–15.4)
PLATELET # BLD: 263 K/UL (ref 135–450)
PMV BLD AUTO: 8 FL (ref 5–10.5)
POTASSIUM REFLEX MAGNESIUM: 4.4 MMOL/L (ref 3.5–5.1)
RBC # BLD: 5.38 M/UL (ref 4.2–5.9)
SODIUM BLD-SCNC: 136 MMOL/L (ref 136–145)
WBC # BLD: 4.3 K/UL (ref 4–11)

## 2020-08-23 PROCEDURE — 71046 X-RAY EXAM CHEST 2 VIEWS: CPT

## 2020-08-23 PROCEDURE — 6370000000 HC RX 637 (ALT 250 FOR IP): Performed by: PHYSICIAN ASSISTANT

## 2020-08-23 PROCEDURE — 99284 EMERGENCY DEPT VISIT MOD MDM: CPT

## 2020-08-23 PROCEDURE — 85025 COMPLETE CBC W/AUTO DIFF WBC: CPT

## 2020-08-23 PROCEDURE — U0003 INFECTIOUS AGENT DETECTION BY NUCLEIC ACID (DNA OR RNA); SEVERE ACUTE RESPIRATORY SYNDROME CORONAVIRUS 2 (SARS-COV-2) (CORONAVIRUS DISEASE [COVID-19]), AMPLIFIED PROBE TECHNIQUE, MAKING USE OF HIGH THROUGHPUT TECHNOLOGIES AS DESCRIBED BY CMS-2020-01-R: HCPCS

## 2020-08-23 PROCEDURE — 80048 BASIC METABOLIC PNL TOTAL CA: CPT

## 2020-08-23 RX ORDER — ONDANSETRON 4 MG/1
4 TABLET, ORALLY DISINTEGRATING ORAL ONCE
Status: COMPLETED | OUTPATIENT
Start: 2020-08-23 | End: 2020-08-23

## 2020-08-23 RX ORDER — ONDANSETRON 4 MG/1
4 TABLET, FILM COATED ORAL EVERY 8 HOURS PRN
Qty: 12 TABLET | Refills: 0 | Status: SHIPPED | OUTPATIENT
Start: 2020-08-23

## 2020-08-23 RX ADMIN — ONDANSETRON 4 MG: 4 TABLET, ORALLY DISINTEGRATING ORAL at 13:44

## 2020-08-23 ASSESSMENT — ENCOUNTER SYMPTOMS
BACK PAIN: 0
ABDOMINAL PAIN: 0
COUGH: 1
DIARRHEA: 0
BLOOD IN STOOL: 0
NAUSEA: 1
VOMITING: 1
SHORTNESS OF BREATH: 0

## 2020-08-23 NOTE — ED NOTES
Patient prepared for and ready to be discharged. Patient discharged at this time in no acute distress after verbalizing understanding of discharge instructions. Patient left after receiving After Visit Summary instructions.       Fior Santoro RN  08/23/20 [id number]

## 2020-08-23 NOTE — ED TRIAGE NOTES
Pt comes to ER with c/o fever and vomiting for the past 3 days. Denies any fevers or coming in contact with any one sick.

## 2020-08-23 NOTE — ED PROVIDER NOTES
ED Attending Attestation Note     Date of evaluation: 8/23/2020    This patient was seen by the advance practice provider. I have seen and examined the patient, agree with the workup, evaluation, management and diagnosis. The care plan has been discussed. My assessment reveals a 23 yom who presents with CC of cough, emesis. Patient with normal work of breathing, benign abdomen. A few days of symptoms.         Pasquale Rivas MD  08/23/20 [id number]

## 2020-08-23 NOTE — ED PROVIDER NOTES
[address] [facility]          PHYSICIAN ASSISTANT NOTE       Date of evaluation: 8/23/2020    Chief Complaint     Cough and Emesis      History of Present Illness     Elsa Chambers is a 23 y.o. male who presents with a history of seizures and developmental delay who comes to the emergency department complaining of 2 to 3 days of nausea with several episodes of vomiting, his last episode of vomiting being yesterday. He denies having blood in any of his emesis. He also reports that he has been less hungry over the previous 2 days and it is caused him to eat less food and drink less fluids. He also endorses a productive cough that is been ongoing and accompanying his symptoms. He denies sick contacts. He denies other symptoms at this time including fevers, chills, shortness of breath, chest pain, abdominal pain, blood in his stool, urinary symptoms or diarrhea. He also denies back pain at this time. Review of Systems     Review of Systems   Constitutional: Negative for chills and fever. Respiratory: Positive for cough. Negative for shortness of breath. Cardiovascular: Negative for chest pain. Gastrointestinal: Positive for nausea and vomiting. Negative for abdominal pain, blood in stool and diarrhea. Genitourinary: Negative for dysuria. Musculoskeletal: Negative for back pain. Neurological: Negative for dizziness and headaches. Past Medical, Surgical, Family, and Social History     He has a past medical history of Seizures ([facility] [address] He has a past surgical history that includes Tonsillectomy. His family history is not on file. He reports that he has quit smoking. He has never used smokeless tobacco. He reports current drug use. Drug: Marijuana. He reports that he does not drink alcohol.     Medications     Previous Medications    ARIPIPRAZOLE (ABILIFY) 10 MG TABLET    Take 10 mg by mouth daily    DIVALPROEX (DEPAKOTE ER) 500 MG EXTENDED RELEASE TABLET    Take 500 mg by mouth 2 times daily    OXCARBAZEPINE (TRILEPTAL) 300 MG TABLET    Take 300 mg by mouth 2 times daily       Allergies     He has No Known Allergies. Physical Exam     INITIAL VITALS: BP: (!) 136/97, Temp: 97.9 °F (36.6 °C), Pulse: 70, Resp: 15, SpO2: 99 %  Physical Exam  Constitutional:       General: He is not in acute distress. Appearance: Normal appearance. He is not ill-appearing or toxic-appearing. HENT:      Head: Normocephalic and atraumatic. Neck:      Musculoskeletal: Normal range of motion. Cardiovascular:      Rate and Rhythm: Normal rate and regular rhythm. Pulses: Normal pulses. Heart sounds: No murmur. No friction rub. No gallop. Pulmonary:      Effort: Pulmonary effort is normal.      Breath sounds: Normal breath sounds. Abdominal:      General: Abdomen is flat. There is no distension. Palpations: Abdomen is soft. There is mass. Tenderness: There is no abdominal tenderness. There is no guarding or rebound. Musculoskeletal: Normal range of motion. Skin:     General: Skin is warm and dry. Neurological:      General: No focal deficit present. Mental Status: He is alert and oriented to person, place, and time. Psychiatric:         Mood and Affect: Mood normal.      Comments: Patient expresses some signs of developmental delay including inappropriate laughter relative to our conversation as well as childlike questioning. He is very pleasant, agreeable and engages in our conversation well fully.          Diagnostic Results         RADIOLOGY:  XR CHEST (2 VW)    (Results Pending)       LABS:   Results for orders placed or performed during the hospital encounter of 31/42/59   Basic Metabolic Panel w/ Reflex to MG   Result Value Ref Range    Sodium 136 136 - 145 mmol/L    Potassium reflex Magnesium 4.4 3.5 - 5.1 mmol/L    Chloride 103 99 - 110 mmol/L    CO2 22 21 - 32 mmol/L    Anion Gap 11 3 - 16    Glucose 125 (H) 70 - 99 mg/dL    BUN 19 7 - 20 mg/dL    CREATININE 0.9 0.9 - 1.3 mg/dL    GFR Non-African American >60 >60    GFR African American >60 >60    Calcium 10.0 8.3 - 10.6 mg/dL   CBC Auto Differential   Result Value Ref Range    WBC 4.3 4.0 - 11.0 K/uL    RBC 5.38 4.20 - 5.90 M/uL    Hemoglobin 15.6 13.5 - 17.5 g/dL    Hematocrit 46.2 40.5 - 52.5 %    MCV 86.0 80.0 - 100.0 fL    MCH 29.0 26.0 - 34.0 pg    MCHC 33.7 31.0 - 36.0 g/dL    RDW 14.5 12.4 - 15.4 %    Platelets 613 823 - 403 K/uL    MPV 8.0 5.0 - 10.5 fL    Neutrophils % 70.4 %    Lymphocytes % 23.0 %    Monocytes % 5.3 %    Eosinophils % 0.8 %    Basophils % 0.5 %    Neutrophils Absolute 3.0 1.7 - 7.7 K/uL    Lymphocytes Absolute 1.0 1.0 - 5.1 K/uL    Monocytes Absolute 0.2 0.0 - 1.3 K/uL    Eosinophils Absolute 0.0 0.0 - 0.6 K/uL    Basophils Absolute 0.0 0.0 - 0.2 K/uL       ED BEDSIDE ULTRASOUND:      RECENT VITALS:  BP: (!) 136/97, Temp: 97.9 °F (36.6 °C), Pulse: 70, Resp: 15, SpO2: 99 %     Procedures         ED Course     Nursing Notes, Past Medical Hx,Past Surgical Hx, Social Hx, Allergies, and Family Hx were reviewed. The patient was given the following medications:  Orders Placed This Encounter   Medications    ondansetron (ZOFRAN-ODT) disintegrating tablet 4 mg       CONSULTS:  None    MEDICAL Can[employer] Govern / ASSESSMENT / Segundo Marissa is a 23 y.o. male with a history of seizures and developmental delay who comes to the emergency department complaining of 2 to 3 days of nausea with several episodes of vomiting, his last episode of vomiting being yesterday. He denies having blood in any of his emesis. He also reports that he has been less hungry over the previous 2 days and it is caused him to eat less food and drink less fluids. He also endorses a productive cough that is been ongoing and accompanying his symptoms. He denies sick contacts.   He denies other symptoms at this time including fevers, chills, shortness of breath, chest pain, abdominal pain, blood in

## 2020-08-24 ENCOUNTER — CARE COORDINATION (OUTPATIENT)
Dept: CARE COORDINATION | Age: 20
End: 2020-08-24

## 2020-08-24 LAB
REPORT: NORMAL
SARS-COV-2: NOT DETECTED
THIS TEST SENT TO: NORMAL

## 2020-08-24 NOTE — CARE COORDINATION
Patient contacted regarding Samuel Raymond. Discussed COVID-19 related testing which was pending at this time. Care Transition Nurse/ Ambulatory Care Manager contacted the patient by telephone to perform post discharge assessment. Verified name and  with patient as identifiers. Provided introduction to self, and explanation of the CTN/ACM role, and reason for call due to risk factors for infection and/or exposure to COVID-19. Symptoms reviewed with patient who verbalized the following symptoms: cough, nausea, no new symptoms, no worsening symptoms and low appetite. Due to no new or worsening symptoms encounter was not routed to provider for escalation. Discussed follow-up appointments. If no appointment was previously scheduled, appointment scheduling offered: Yes declined  [facility] follow up appointment(s): No future appointments. Non-[facility] follow up appointment(s): will call pcp at  to inform of recent ED visit, covid testing, and request vv. Advance Care Planning:   Does patient have an Advance Directive:  not on file; education provided. Patient has following risk factors of: DD. CTN/ACM reviewed discharge instructions, medical action plan and red flags such as increased shortness of breath, increasing fever and signs of decompensation with patient who verbalized understanding. Discussed exposure protocols and quarantine with CDC Guidelines What to do if you are sick with coronavirus disease .  Patient was given an opportunity for questions and concerns. The patient agrees to contact the [facility] exposure line [phone], [facility] department [place] Department of Health: ([phone]) and PCP office for questions related to their healthcare. CTN/ACM provided contact information for future needs.     Reviewed and educated patient on any new and changed medications related to discharge diagnosis     Patient/family/caregiver given information for Zack[facility] Momin and agrees to enroll yes  Patient's preferred e-mail: [email or web address]    Patient's preferred phone number: [phone]  Based on Loop alert triggers, patient will be contacted by nurse care manager for worsening symptoms. Pt will be further monitored by COVID Loop Team based on severity of symptoms and risk factors.

## 2020-10-22 ENCOUNTER — HOSPITAL ENCOUNTER (EMERGENCY)
Age: 20
Discharge: HOME | End: 2020-10-22
Attending: EMERGENCY MEDICINE
Payer: COMMERCIAL

## 2020-10-22 VITALS
SYSTOLIC BLOOD PRESSURE: 104 MMHG | HEART RATE: 57 BPM | RESPIRATION RATE: 15 BRPM | DIASTOLIC BLOOD PRESSURE: 65 MMHG | TEMPERATURE: 98 F | OXYGEN SATURATION: 98 %

## 2020-10-22 LAB
A/G RATIO: 2.4 (ref 1.1–2.2)
ALBUMIN SERPL-MCNC: 4.8 G/DL (ref 3.4–5)
ALP BLD-CCNC: 62 U/L (ref 40–129)
ALT SERPL-CCNC: 11 U/L (ref 10–40)
ANION GAP SERPL CALCULATED.3IONS-SCNC: 14 MMOL/L (ref 3–16)
AST SERPL-CCNC: 22 U/L (ref 15–37)
BASOPHILS ABSOLUTE: 0 K/UL (ref 0–0.2)
BASOPHILS RELATIVE PERCENT: 1 %
BILIRUB SERPL-MCNC: 0.4 MG/DL (ref 0–1)
BUN BLDV-MCNC: 20 MG/DL (ref 7–20)
CALCIUM SERPL-MCNC: 10 MG/DL (ref 8.3–10.6)
CHLORIDE BLD-SCNC: 103 MMOL/L (ref 99–110)
CO2: 24 MMOL/L (ref 21–32)
CREAT SERPL-MCNC: 1 MG/DL (ref 0.9–1.3)
EOSINOPHILS ABSOLUTE: 0 K/UL (ref 0–0.6)
EOSINOPHILS RELATIVE PERCENT: 0.7 %
GFR AFRICAN AMERICAN: >60
GFR NON-AFRICAN AMERICAN: >60
GLOBULIN: 2 G/DL
GLUCOSE BLD-MCNC: 118 MG/DL (ref 70–99)
HCT VFR BLD CALC: 41.9 % (ref 40.5–52.5)
HEMOGLOBIN: 14.1 G/DL (ref 13.5–17.5)
LYMPHOCYTES ABSOLUTE: 0.9 K/UL (ref 1–5.1)
LYMPHOCYTES RELATIVE PERCENT: 29.2 %
MCH RBC QN AUTO: 29.3 PG (ref 26–34)
MCHC RBC AUTO-ENTMCNC: 33.7 G/DL (ref 31–36)
MCV RBC AUTO: 87 FL (ref 80–100)
MONOCYTES ABSOLUTE: 0.2 K/UL (ref 0–1.3)
MONOCYTES RELATIVE PERCENT: 6.3 %
NEUTROPHILS ABSOLUTE: 2 K/UL (ref 1.7–7.7)
NEUTROPHILS RELATIVE PERCENT: 62.8 %
PDW BLD-RTO: 16.4 % (ref 12.4–15.4)
PLATELET # BLD: 240 K/UL (ref 135–450)
PMV BLD AUTO: 8.4 FL (ref 5–10.5)
POTASSIUM REFLEX MAGNESIUM: 4.6 MMOL/L (ref 3.5–5.1)
RBC # BLD: 4.81 M/UL (ref 4.2–5.9)
SODIUM BLD-SCNC: 141 MMOL/L (ref 136–145)
TOTAL PROTEIN: 6.8 G/DL (ref 6.4–8.2)
VALPROIC ACID LEVEL: 5 UG/ML (ref 50–100)
WBC # BLD: 3.1 K/UL (ref 4–11)

## 2020-10-22 PROCEDURE — 96365 THER/PROPH/DIAG IV INF INIT: CPT

## 2020-10-22 PROCEDURE — 80053 COMPREHEN METABOLIC PANEL: CPT

## 2020-10-22 PROCEDURE — 85025 COMPLETE CBC W/AUTO DIFF WBC: CPT

## 2020-10-22 PROCEDURE — 80164 ASSAY DIPROPYLACETIC ACD TOT: CPT

## 2020-10-22 PROCEDURE — 2500000003 HC RX 250 WO HCPCS: Performed by: EMERGENCY MEDICINE

## 2020-10-22 PROCEDURE — 2580000003 HC RX 258: Performed by: EMERGENCY MEDICINE

## 2020-10-22 PROCEDURE — 99284 EMERGENCY DEPT VISIT MOD MDM: CPT

## 2020-10-22 RX ADMIN — DEXTROSE MONOHYDRATE 1000 MG: 50 INJECTION, SOLUTION INTRAVENOUS at 19:51

## 2020-10-22 ASSESSMENT — ENCOUNTER SYMPTOMS
ABDOMINAL DISTENTION: 0
EYE DISCHARGE: 0
APNEA: 0
TROUBLE SWALLOWING: 0
FACIAL SWELLING: 0
VOMITING: 0

## 2020-10-22 NOTE — ED PROVIDER NOTES
[address] [facility] [address] ENCOUNTER          ATTENDING PHYSICIAN NOTE       Date of evaluation: 10/22/2020    Chief Complaint     Seizures (unwitnessed, compliant with meds)      History of Present Illness     Sal Ogden is a 23 y.o. male who presents after a grand mal seizure. Patient has a history of seizures and was last here in June for a seizure. He lives with his grandmother and is unable to tell me what he was doing at the time he had the seizure. He denies any recent illnesses. No cough fever chills or sweats. Currently is denying headache chest pain or abdominal pain. States he has been taking his seizure medications. Review of Systems     Review of Systems   Constitutional: Negative for chills and fever. HENT: Negative for congestion, facial swelling and trouble swallowing. Eyes: Negative for discharge and visual disturbance. Respiratory: Negative for apnea. Cardiovascular: Negative for palpitations and leg swelling. Gastrointestinal: Negative for abdominal distention and vomiting. Musculoskeletal: Negative for arthralgias and gait problem. Skin: Negative for rash. Neurological: Positive for seizures. Negative for speech difficulty and weakness. Psychiatric/Behavioral: Positive for confusion. Negative for behavioral problems. All other systems reviewed and are negative. Past Medical, Surgical, Family, and Social History     He has a past medical history of Seizures ([facility] [address] He has a past surgical history that includes Tonsillectomy. His family history is not on file. He reports that he has quit smoking. He has never used smokeless tobacco. He reports current drug use. Drug: Marijuana. He reports that he does not drink alcohol.     Medications     Previous Medications    ARIPIPRAZOLE (ABILIFY) 10 MG TABLET    Take 10 mg by mouth daily    DIVALPROEX (DEPAKOTE ER) 500 MG EXTENDED RELEASE TABLET    Take 500 mg by mouth 2 times daily    ONDANSETRON 1.1 - 2.2    Total Bilirubin 0.4 0.0 - 1.0 mg/dL    Alkaline Phosphatase 62 40 - 129 U/L    ALT 11 10 - 40 U/L    AST 22 15 - 37 U/L    Globulin 2.0 g/dL   CBC auto differential   Result Value Ref Range    WBC 3.1 (L) 4.0 - 11.0 K/uL    RBC 4.81 4.20 - 5.90 M/uL    Hemoglobin 14.1 13.5 - 17.5 g/dL    Hematocrit 41.9 40.5 - 52.5 %    MCV 87.0 80.0 - 100.0 fL    MCH 29.3 26.0 - 34.0 pg    MCHC 33.7 31.0 - 36.0 g/dL    RDW 16.4 (H) 12.4 - 15.4 %    Platelets 797 505 - 926 K/uL    MPV 8.4 5.0 - 10.5 fL    Neutrophils % 62.8 %    Lymphocytes % 29.2 %    Monocytes % 6.3 %    Eosinophils % 0.7 %    Basophils % 1.0 %    Neutrophils Absolute 2.0 1.7 - 7.7 K/uL    Lymphocytes Absolute 0.9 (L) 1.0 - 5.1 K/uL    Monocytes Absolute 0.2 0.0 - 1.3 K/uL    Eosinophils Absolute 0.0 0.0 - 0.6 K/uL    Basophils Absolute 0.0 0.0 - 0.2 K/uL           RECENT VITALS:  BP: 111/68,Temp: 98 °F (36.7 °C), Pulse: 77, Resp: 27, SpO2: 99 %     Procedures         ED Course     Nursing Notes, Past Medical Hx, Past Surgical Hx, Social Hx,Allergies, and Family Hx were reviewed. patient was given the following medications:  Orders Placed This Encounter   Medications    valproate (DEPACON) 1,000 mg in dextrose 5 % 100 mL IVPB       CONSULTS:  None    MEDICAL DECISIONMAKING / ASSESSMENT / Lauryn Matthew is a 23 y.o. male presents after a grand mal seizure. When patient originally presented he was mildly postictal with no focal neurologic deficits. From his previous note he is supposedly on Trileptal and Depakote. He states he has been taking Depakote and Trileptal however patient had a very low Depakote level therefore I loaded him with IV Depakote and will discharge him home to follow-up with his family physician. Patient's exam was unremarkable and his labs showed a slightly low white count of 3.1 and than a low Depakote level. Otherwise labs unremarkable. Clinical Impression     1. Seizure ([address]    2.  Breakthrough seizure ([address] Disposition     PATIENT REFERRED TO:  Follow-up with your primary care physician.     DISCHARGE MEDICATIONS:  New Prescriptions    No medications on file       DISPOSITION         Dyana Shannon MD  10/22/20 4820

## 2020-10-23 NOTE — ED NOTES
This RN spoke with Matt Ochoa, who presented to ED to transport patient home. Patient discharged via wheelchair to lobby to wait for ride.       Moncho Pandey RN  10/22/20 4154

## 2021-04-08 ENCOUNTER — HOSPITAL ENCOUNTER (EMERGENCY)
Age: 21
Discharge: HOME | End: 2021-04-08
Attending: EMERGENCY MEDICINE
Payer: COMMERCIAL

## 2021-04-08 ENCOUNTER — APPOINTMENT (OUTPATIENT)
Dept: CT IMAGING | Age: 21
End: 2021-04-08
Payer: COMMERCIAL

## 2021-04-08 VITALS
SYSTOLIC BLOOD PRESSURE: 125 MMHG | HEART RATE: 60 BPM | RESPIRATION RATE: 18 BRPM | WEIGHT: 189.8 LBS | DIASTOLIC BLOOD PRESSURE: 77 MMHG | TEMPERATURE: 98.1 F | OXYGEN SATURATION: 97 %

## 2021-04-08 DIAGNOSIS — R56.9 SEIZURE (HCC): Primary | ICD-10-CM

## 2021-04-08 LAB
ALBUMIN SERPL-MCNC: 4.7 G/DL (ref 3.4–5)
ALP BLD-CCNC: 77 U/L (ref 40–129)
ALT SERPL-CCNC: 36 U/L (ref 10–40)
AMPHETAMINE SCREEN, URINE: NORMAL
ANION GAP SERPL CALCULATED.3IONS-SCNC: 10 MMOL/L (ref 3–16)
AST SERPL-CCNC: 37 U/L (ref 15–37)
BARBITURATE SCREEN URINE: NORMAL
BASOPHILS ABSOLUTE: 0 K/UL (ref 0–0.2)
BASOPHILS RELATIVE PERCENT: 0.5 %
BENZODIAZEPINE SCREEN, URINE: NORMAL
BILIRUB SERPL-MCNC: 0.4 MG/DL (ref 0–1)
BILIRUBIN DIRECT: <0.2 MG/DL (ref 0–0.3)
BILIRUBIN URINE: NEGATIVE
BILIRUBIN, INDIRECT: NORMAL MG/DL (ref 0–1)
BLOOD, URINE: NEGATIVE
BUN BLDV-MCNC: 23 MG/DL (ref 7–20)
CALCIUM SERPL-MCNC: 9.5 MG/DL (ref 8.3–10.6)
CANNABINOID SCREEN URINE: NORMAL
CHLORIDE BLD-SCNC: 101 MMOL/L (ref 99–110)
CLARITY: CLEAR
CO2: 25 MMOL/L (ref 21–32)
COCAINE METABOLITE SCREEN URINE: NORMAL
COLOR: YELLOW
CREAT SERPL-MCNC: 0.9 MG/DL (ref 0.9–1.3)
EOSINOPHILS ABSOLUTE: 0 K/UL (ref 0–0.6)
EOSINOPHILS RELATIVE PERCENT: 0.3 %
GFR AFRICAN AMERICAN: >60
GFR NON-AFRICAN AMERICAN: >60
GLUCOSE BLD-MCNC: 89 MG/DL (ref 70–99)
GLUCOSE URINE: NEGATIVE MG/DL
HCT VFR BLD CALC: 43.1 % (ref 40.5–52.5)
HEMOGLOBIN: 14.7 G/DL (ref 13.5–17.5)
KETONES, URINE: NEGATIVE MG/DL
LACTIC ACID, SEPSIS: 0.7 MMOL/L (ref 0.4–1.9)
LEUKOCYTE ESTERASE, URINE: NEGATIVE
LYMPHOCYTES ABSOLUTE: 0.9 K/UL (ref 1–5.1)
LYMPHOCYTES RELATIVE PERCENT: 14.5 %
Lab: NORMAL
MCH RBC QN AUTO: 29.7 PG (ref 26–34)
MCHC RBC AUTO-ENTMCNC: 34.1 G/DL (ref 31–36)
MCV RBC AUTO: 87.1 FL (ref 80–100)
METHADONE SCREEN, URINE: NORMAL
MICROSCOPIC EXAMINATION: ABNORMAL
MONOCYTES ABSOLUTE: 0.3 K/UL (ref 0–1.3)
MONOCYTES RELATIVE PERCENT: 5.2 %
NEUTROPHILS ABSOLUTE: 4.8 K/UL (ref 1.7–7.7)
NEUTROPHILS RELATIVE PERCENT: 79.5 %
NITRITE, URINE: NEGATIVE
OPIATE SCREEN URINE: NORMAL
OXYCODONE URINE: NORMAL
PDW BLD-RTO: 13.7 % (ref 12.4–15.4)
PH UA: 7.5
PH UA: 7.5 (ref 5–8)
PHENCYCLIDINE SCREEN URINE: NORMAL
PLATELET # BLD: 317 K/UL (ref 135–450)
PMV BLD AUTO: 8.2 FL (ref 5–10.5)
POTASSIUM REFLEX MAGNESIUM: 4.4 MMOL/L (ref 3.5–5.1)
PROPOXYPHENE SCREEN: NORMAL
PROTEIN UA: NEGATIVE MG/DL
RBC # BLD: 4.95 M/UL (ref 4.2–5.9)
SODIUM BLD-SCNC: 136 MMOL/L (ref 136–145)
SPECIFIC GRAVITY UA: 1.01 (ref 1–1.03)
TOTAL PROTEIN: 7.2 G/DL (ref 6.4–8.2)
URINE TYPE: ABNORMAL
UROBILINOGEN, URINE: 4 E.U./DL
VALPROIC ACID LEVEL: <2.8 UG/ML (ref 50–100)
WBC # BLD: 6.1 K/UL (ref 4–11)

## 2021-04-08 PROCEDURE — 70486 CT MAXILLOFACIAL W/O DYE: CPT

## 2021-04-08 PROCEDURE — 99283 EMERGENCY DEPT VISIT LOW MDM: CPT

## 2021-04-08 PROCEDURE — 6370000000 HC RX 637 (ALT 250 FOR IP): Performed by: PHYSICIAN ASSISTANT

## 2021-04-08 PROCEDURE — 80164 ASSAY DIPROPYLACETIC ACD TOT: CPT

## 2021-04-08 PROCEDURE — 80076 HEPATIC FUNCTION PANEL: CPT

## 2021-04-08 PROCEDURE — 80307 DRUG TEST PRSMV CHEM ANLYZR: CPT

## 2021-04-08 PROCEDURE — 2580000003 HC RX 258: Performed by: PHYSICIAN ASSISTANT

## 2021-04-08 PROCEDURE — 80048 BASIC METABOLIC PNL TOTAL CA: CPT

## 2021-04-08 PROCEDURE — 36415 COLL VENOUS BLD VENIPUNCTURE: CPT

## 2021-04-08 PROCEDURE — 81003 URINALYSIS AUTO W/O SCOPE: CPT

## 2021-04-08 PROCEDURE — 85025 COMPLETE CBC W/AUTO DIFF WBC: CPT

## 2021-04-08 PROCEDURE — 96374 THER/PROPH/DIAG INJ IV PUSH: CPT

## 2021-04-08 PROCEDURE — 6360000002 HC RX W HCPCS: Performed by: PHYSICIAN ASSISTANT

## 2021-04-08 PROCEDURE — 83605 ASSAY OF LACTIC ACID: CPT

## 2021-04-08 RX ORDER — 0.9 % SODIUM CHLORIDE 0.9 %
1000 INTRAVENOUS SOLUTION INTRAVENOUS ONCE
Status: COMPLETED | OUTPATIENT
Start: 2021-04-08 | End: 2021-04-08

## 2021-04-08 RX ORDER — LORAZEPAM 1 MG/1
TABLET ORAL
Qty: 6 TABLET | Refills: 0 | Status: SHIPPED | OUTPATIENT
Start: 2021-04-09 | End: 2021-04-11

## 2021-04-08 RX ORDER — 0.9 % SODIUM CHLORIDE 0.9 %
1000 INTRAVENOUS SOLUTION INTRAVENOUS ONCE
Status: DISCONTINUED | OUTPATIENT
Start: 2021-04-08 | End: 2021-04-08

## 2021-04-08 RX ORDER — BACITRACIN ZINC AND POLYMYXIN B SULFATE 500; 1000 [USP'U]/G; [USP'U]/G
OINTMENT TOPICAL ONCE
Status: COMPLETED | OUTPATIENT
Start: 2021-04-08 | End: 2021-04-08

## 2021-04-08 RX ORDER — LORAZEPAM 2 MG/ML
1 INJECTION INTRAMUSCULAR ONCE
Status: COMPLETED | OUTPATIENT
Start: 2021-04-08 | End: 2021-04-08

## 2021-04-08 RX ADMIN — SODIUM CHLORIDE 1000 ML: 9 INJECTION, SOLUTION INTRAVENOUS at 16:16

## 2021-04-08 RX ADMIN — LORAZEPAM 1 MG: 2 INJECTION INTRAMUSCULAR; INTRAVENOUS at 16:16

## 2021-04-08 RX ADMIN — BACITRACIN ZINC AND POLYMYXIN B SULFATE: 500; 10000 OINTMENT TOPICAL at 16:16

## 2021-04-08 ASSESSMENT — PAIN DESCRIPTION - PAIN TYPE: TYPE: ACUTE PAIN

## 2021-04-08 ASSESSMENT — PAIN DESCRIPTION - FREQUENCY: FREQUENCY: CONTINUOUS

## 2021-04-08 ASSESSMENT — PAIN - FUNCTIONAL ASSESSMENT: PAIN_FUNCTIONAL_ASSESSMENT: ACTIVITIES ARE NOT PREVENTED

## 2021-04-08 ASSESSMENT — ENCOUNTER SYMPTOMS
BACK PAIN: 0
NAUSEA: 0
CHEST TIGHTNESS: 0
VOMITING: 0
ABDOMINAL PAIN: 0
SHORTNESS OF BREATH: 0
COLOR CHANGE: 0

## 2021-04-08 NOTE — ED PROVIDER NOTES
ED Attending Attestation Note     Date of evaluation: 4/8/2021    This patient was seen by the advance practice provider. I have seen and examined the patient, agree with the workup, evaluation, management and diagnosis. The care plan has been discussed. My assessment reveals a 78-year-old male with a history of epilepsy on antiepileptic medication who presents today after a seizure. Patient last recalls laying in bed. He regained consciousness and was found to have a large abrasion on his head. Tetanus immunization last updated February 11, 2021. No focal neuro deficits.       Franco Cardoza MD  04/08/21 0866

## 2021-04-08 NOTE — ED PROVIDER NOTES
([facility] He has no past surgical history on file. His family history is not on file. He reports that he has never smoked. He has never used smokeless tobacco. He reports that he does not drink alcohol. Medications     Previous Medications    ARIPIPRAZOLE (ABILIFY) 10 MG TABLET    Take 10 mg by mouth daily    CHOLECALCIFEROL (VITAMIN D3) 1000 UNITS TABS    Take 1 tablet by mouth 2 times daily    DIAZEPAM (DIASTAT ADULT RE)    Place 12.5 mg rectally For seizures lasting longer than 5 minutes or more than 1 seizure in 8 hours. DIVALPROEX (DEPAKOTE ER) 500 MG EXTENDED RELEASE TABLET    Take 4 tablets by mouth every morning    IBUPROFEN (ADVIL;MOTRIN) 800 MG TABLET    Take 1 tablet by mouth every 8 hours as needed for Pain    OXCARBAZEPINE (TRILEPTAL) 300 MG TABLET    Take 2 tablets by mouth 2 times daily    PALIPERIDONE (INVEGA) 3 MG EXTENDED RELEASE TABLET    Take 1 tablet by mouth nightly       Allergies     He is allergic to methadone and methylphenidate hcl. Physical Exam     INITIAL VITALS: BP: (!) 139/92, Temp: 98.1 °F (36.7 °C), Pulse: 82, Resp: 17, SpO2: 96 %  Physical Exam  Vitals signs and nursing note reviewed. Constitutional:       Appearance: Normal appearance. HENT:      Head: Normocephalic. Comments: Abrasion involving the left side of the forehead extending to the lateral portion of the left eye, no skin flap, no underlying laceration, no bony crepitus, underlying tenderness present, extraocular eye movements normal, pupils equal round and reactive, no subconjunctival hemorrhage, sclera are white  Eyes:      Extraocular Movements: Extraocular movements intact. Pupils: Pupils are equal, round, and reactive to light. Cardiovascular:      Rate and Rhythm: Normal rate and regular rhythm. Pulses: Normal pulses. Heart sounds: Normal heart sounds. Pulmonary:      Effort: Pulmonary effort is normal.   Abdominal:      Tenderness: There is no abdominal tenderness. Musculoskeletal: Normal range of motion. Right lower leg: No edema. Left lower leg: No edema. Skin:     General: Skin is warm and dry. Neurological:      General: No focal deficit present. Mental Status: He is alert and oriented to person, place, and time. Mental status is at baseline. Cranial Nerves: No cranial nerve deficit. Sensory: No sensory deficit. Motor: No weakness. Gait: Gait normal.      Deep Tendon Reflexes: Reflexes normal.   Psychiatric:         Mood and Affect: Mood normal.         Behavior: Behavior normal.         Diagnostic Results     RADIOLOGY:  CT MAXILLOFACIAL WO CONTRAST   Final Result   Impression: No acute osseous abnormality. Small left frontal cephalohematoma.           LABS:   Results for orders placed or performed during the hospital encounter of 04/08/21   CBC Auto Differential   Result Value Ref Range    WBC 6.1 4.0 - 11.0 K/uL    RBC 4.95 4.20 - 5.90 M/uL    Hemoglobin 14.7 13.5 - 17.5 g/dL    Hematocrit 43.1 40.5 - 52.5 %    MCV 87.1 80.0 - 100.0 fL    MCH 29.7 26.0 - 34.0 pg    MCHC 34.1 31.0 - 36.0 g/dL    RDW 13.7 12.4 - 15.4 %    Platelets 505 086 - 185 K/uL    MPV 8.2 5.0 - 10.5 fL    Neutrophils % 79.5 %    Lymphocytes % 14.5 %    Monocytes % 5.2 %    Eosinophils % 0.3 %    Basophils % 0.5 %    Neutrophils Absolute 4.8 1.7 - 7.7 K/uL    Lymphocytes Absolute 0.9 (L) 1.0 - 5.1 K/uL    Monocytes Absolute 0.3 0.0 - 1.3 K/uL    Eosinophils Absolute 0.0 0.0 - 0.6 K/uL    Basophils Absolute 0.0 0.0 - 0.2 K/uL   Basic Metabolic Panel w/ Reflex to MG   Result Value Ref Range    Sodium 136 136 - 145 mmol/L    Potassium reflex Magnesium 4.4 3.5 - 5.1 mmol/L    Chloride 101 99 - 110 mmol/L    CO2 25 21 - 32 mmol/L    Anion Gap 10 3 - 16    Glucose 89 70 - 99 mg/dL    BUN 23 (H) 7 - 20 mg/dL    CREATININE 0.9 0.9 - 1.3 mg/dL    GFR Non-African American >60 >60    GFR African American >60 >60    Calcium 9.5 8.3 - 10.6 mg/dL   Hepatic Function Panel   Result Value Ref Range    Total Protein 7.2 6.4 - 8.2 g/dL    Albumin 4.7 3.4 - 5.0 g/dL    Alkaline Phosphatase 77 40 - 129 U/L    ALT 36 10 - 40 U/L    AST 37 15 - 37 U/L    Total Bilirubin 0.4 0.0 - 1.0 mg/dL    Bilirubin, Direct <0.2 0.0 - 0.3 mg/dL    Bilirubin, Indirect see below 0.0 - 1.0 mg/dL   Lactate, Sepsis   Result Value Ref Range    Lactic Acid, Sepsis 0.7 0.4 - 1.9 mmol/L   Urinalysis, reflex to microscopic   Result Value Ref Range    Color, UA Yellow Straw/Yellow    Clarity, UA Clear Clear    Glucose, Ur Negative Negative mg/dL    Bilirubin Urine Negative Negative    Ketones, Urine Negative Negative mg/dL    Specific Gravity, UA 1.015 1.005 - 1.030    Blood, Urine Negative Negative    pH, UA 7.5 5.0 - 8.0    Protein, UA Negative Negative mg/dL    Urobilinogen, Urine 4.0 (A) <2.0 E.U./dL    Nitrite, Urine Negative Negative    Leukocyte Esterase, Urine Negative Negative    Microscopic Examination Not Indicated     Urine Type NotGiven    Urine Drug Screen   Result Value Ref Range    Amphetamine Screen, Urine Neg Negative <1000ng/mL    Barbiturate Screen, Ur Neg Negative <200 ng/mL    Benzodiazepine Screen, Urine Neg Negative <200 ng/mL    Cannabinoid Scrn, Ur Neg Negative <50 ng/mL    Cocaine Metabolite Screen, Urine Neg Negative <300 ng/mL    Opiate Scrn, Ur Neg Negative <300 ng/mL    PCP Screen, Urine Neg Negative <25 ng/mL    Methadone Screen, Urine Neg Negative <300 ng/mL    Propoxyphene Scrn, Ur Neg Negative <300 ng/mL    Oxycodone Urine Neg Negative <100 ng/ml    pH, UA 7.5     Drug Screen Comment: see below    Depakote Level   Result Value Ref Range    Valproic Acid Lvl <2.8 (L) 50.0 - 100.0 ug/mL     ED BEDSIDE ULTRASOUND:  None    RECENT VITALS:  BP: 132/87, Temp: 98.1 °F (36.7 °C), Pulse: 62, Resp: 17, SpO2: 98 %     Procedures   None    ED Course     Nursing Notes, Past Medical Hx,Past Surgical Hx, Social Hx, Allergies, and Family Hx were reviewed.     The patient was given the following medications:  Orders Placed This Encounter   Medications    0.9 % sodium chloride bolus    DISCONTD: 0.9 % sodium chloride bolus    bacitracin-polymyxin b (POLYSPORIN) ointment    LORazepam (ATIVAN) injection 1 mg       CONSULTS:  Gideon Saenz / JONE / Rudolph Cal is a 19 y.o. male presenting to the emergency department for evaluation and concern of abrasion on the left side of the forehead. Patient is a history of seizure disorder, and states that he has been compliant with his medications although he believes that he had a seizure this afternoon. He was laying down in bed when he had this episode, and then awoke on his bed, does not know how he got the abrasion on his forehead, no bowel or bladder incontinence, he did not bite his tongue. He has been otherwise feeling well. Upon presentation to the ED he was mildly postictal although he had a normal neuro exam.  He had tenderness along the site of the abrasion with no bony crepitus. CT max face showed no underlying fracture, no evidence of entrapment on exam with normal extraocular eye movements. Neuro exam otherwise normal.    Labs completed showing no leukocytosis or electrolyte abnormalities. He had mildly elevated BUN to creatinine ratio and was given 1 L fluid bolus. Lactate was normal.  UDS negative with no reported illicit drug use. Initially thought the patient was on Depakote, he stated that this level was frequently drawn and low. On chart review it appears that he is not on this medication.  neurology note states that he is on Trileptal, Depakote was discontinued. Discussed with patient's wife who said that his current behavior is normal following seizure. He has not slept since work, and frequently has seizures when he is sleep deprived. Plan to discharge home with [facility] neurology recommendations for follow up and medication changes as needed.  Patient has no acute concerns requiring further intervention or monitoring. This patient was also evaluated by the attending physician. All care plans were discussed and agreed upon. Clinical Impression     1. Seizure ([facility] [address]        Disposition     PATIENT REFERRED TO:  No follow-up provider specified.     DISCHARGE MEDICATIONS:  New Prescriptions    No medications on file       DISPOSITION         Ben Farr PA-C  04/08/21 3213

## 2021-04-08 NOTE — ED NOTES
Bedside report from [facility]. Patient resting in bed, no acute distress. Patient updated on plan of care and verbalizes understanding.  No needs verbalizes at this this      Jonny Rodriguez RN  04/08/21 1932

## 2021-09-29 ENCOUNTER — HOSPITAL ENCOUNTER (EMERGENCY)
Age: 21
Discharge: HOME | End: 2021-09-29
Payer: COMMERCIAL

## 2021-09-29 ENCOUNTER — APPOINTMENT (OUTPATIENT)
Dept: GENERAL RADIOLOGY | Age: 21
End: 2021-09-29
Payer: COMMERCIAL

## 2021-09-29 VITALS
OXYGEN SATURATION: 98 % | SYSTOLIC BLOOD PRESSURE: 128 MMHG | RESPIRATION RATE: 16 BRPM | DIASTOLIC BLOOD PRESSURE: 64 MMHG | TEMPERATURE: 98.5 F | HEART RATE: 73 BPM

## 2021-09-29 DIAGNOSIS — R51.9 ACUTE NONINTRACTABLE HEADACHE, UNSPECIFIED HEADACHE TYPE: ICD-10-CM

## 2021-09-29 DIAGNOSIS — R05.9 COUGH: Primary | ICD-10-CM

## 2021-09-29 PROCEDURE — 71045 X-RAY EXAM CHEST 1 VIEW: CPT

## 2021-09-29 PROCEDURE — 99283 EMERGENCY DEPT VISIT LOW MDM: CPT

## 2021-09-29 PROCEDURE — U0005 INFEC AGEN DETEC AMPLI PROBE: HCPCS

## 2021-09-29 PROCEDURE — U0003 INFECTIOUS AGENT DETECTION BY NUCLEIC ACID (DNA OR RNA); SEVERE ACUTE RESPIRATORY SYNDROME CORONAVIRUS 2 (SARS-COV-2) (CORONAVIRUS DISEASE [COVID-19]), AMPLIFIED PROBE TECHNIQUE, MAKING USE OF HIGH THROUGHPUT TECHNOLOGIES AS DESCRIBED BY CMS-2020-01-R: HCPCS

## 2021-09-29 PROCEDURE — 6370000000 HC RX 637 (ALT 250 FOR IP): Performed by: PHYSICIAN ASSISTANT

## 2021-09-29 RX ORDER — GUAIFENESIN 100 MG/5ML
100 SOLUTION ORAL ONCE
Status: COMPLETED | OUTPATIENT
Start: 2021-09-29 | End: 2021-09-29

## 2021-09-29 RX ORDER — ACETAMINOPHEN 500 MG
1000 TABLET ORAL ONCE
Status: COMPLETED | OUTPATIENT
Start: 2021-09-29 | End: 2021-09-29

## 2021-09-29 RX ADMIN — GUAIFENESIN 100 MG: 100 SOLUTION ORAL at 19:55

## 2021-09-29 RX ADMIN — ACETAMINOPHEN 1000 MG: 500 TABLET, FILM COATED ORAL at 19:55

## 2021-09-29 ASSESSMENT — PAIN DESCRIPTION - PAIN TYPE: TYPE: ACUTE PAIN

## 2021-09-29 ASSESSMENT — PAIN SCALES - GENERAL: PAINLEVEL_OUTOF10: 8

## 2021-09-29 ASSESSMENT — PAIN DESCRIPTION - LOCATION: LOCATION: HEAD

## 2021-09-29 NOTE — ED PROVIDER NOTES
[address] [facility] [address] ENCOUNTER          PHYSICIAN ASSISTANT NOTE       Date of evaluation: 9/29/2021    Chief Complaint     Cough (states he felt hot like he had a fever ) and Headache      History of Present Illness     Carlos Heredia is a 19 y.o. male with past medical history significant for epilepsy, oppositional defiant disorder, ADHD, who presents with complaints of headache and nonproductive cough that began earlier this morning. Patient states that he was feeling totally fine yesterday. States he did not receive his Covid vaccination, as he was told they were unsure how his body would react given that he has a history of epilepsy and is on multiple antiepileptic medications. Denies any sick contacts. States that this morning he began having a generalized headache. Patient drank fluids, but did not take anything yet for his symptoms. States that he was feeling hot and diaphoretic earlier today, despite a fan blowing on him. States that he also has a nonproductive cough. Denies documented fever, chest pain, difficulty breathing, abdominal pain, nausea, vomiting, peripheral edema. Review of Systems     Review of Systems   See HPI, all others negative    Past Medical, Surgical, Family, and Social History     He has a past medical history of ADHD (attention deficit hyperactivity disorder), Cognitive disorder, Depression, Oppositional defiant disorder, and Symptomatic localization-related epilepsy ([facility] [address] He has no past surgical history on file. His family history is not on file. He reports that he has never smoked. He has never used smokeless tobacco. He reports previous drug use. He reports that he does not drink alcohol.     Medications     Discharge Medication List as of 9/29/2021  8:50 PM      CONTINUE these medications which have NOT CHANGED    Details   ibuprofen (ADVIL;MOTRIN) 800 MG tablet Take 1 tablet by mouth every 8 hours as needed for Pain, Disp-30 tablet, R-0Print      paliperidone (INVEGA) 3 MG extended release tablet Take 1 tablet by mouth nightlyHistorical Med      Cholecalciferol (VITAMIN D3) 1000 units TABS Take 1 tablet by mouth 2 times dailyHistorical Med      divalproex (DEPAKOTE ER) 500 MG extended release tablet Take 4 tablets by mouth every morningHistorical Med      OXcarbazepine (TRILEPTAL) 300 MG tablet Take 2 tablets by mouth 2 times dailyHistorical Med      diazePAM (DIASTAT ADULT RE) Place 12.5 mg rectally For seizures lasting longer than 5 minutes or more than 1 seizure in 8 hours. Historical Med      ARIPiprazole (ABILIFY) 10 MG tablet Take 10 mg by mouth daily             Allergies     He is allergic to methadone and methylphenidate hcl. Physical Exam     INITIAL VITALS: BP: 124/81, Temp: 98.5 °F (36.9 °C), Pulse: 74, Resp: 16, SpO2: 99 %  Physical Exam  Constitutional:       General: He is not in acute distress. Appearance: Normal appearance. He is normal weight. He is not ill-appearing, toxic-appearing or diaphoretic. HENT:      Head: Normocephalic and atraumatic. Mouth/Throat:      Mouth: Mucous membranes are moist.   Eyes:      Conjunctiva/sclera: Conjunctivae normal.   Cardiovascular:      Rate and Rhythm: Normal rate. Pulmonary:      Effort: Pulmonary effort is normal. No respiratory distress. Breath sounds: No wheezing, rhonchi or rales. Chest:      Chest wall: No tenderness. Abdominal:      General: There is no distension. Palpations: Abdomen is soft. Tenderness: There is no abdominal tenderness. Musculoskeletal:         General: No swelling or tenderness. Normal range of motion. Skin:     General: Skin is warm and dry. Neurological:      General: No focal deficit present. Mental Status: He is alert and oriented to person, place, and time. Diagnostic Results     RADIOLOGY:  XR CHEST PORTABLE   Final Result      Mild peribronchial thickening. No acute pulmonary consolidation. LABS:   No results found for this visit on 09/29/21. RECENT VITALS:  BP: 128/64, Temp: 98.5 °F (36.9 °C), Pulse: 73, Resp: 16, SpO2: 98 %     Procedures         ED Course     Nursing Notes, Past Medical Hx,Past Surgical Hx, Social Hx, Allergies, and Family Hx were reviewed. The patient was given the following medications:  Orders Placed This Encounter   Medications    acetaminophen (TYLENOL) tablet 1,000 mg    guaiFENesin (ROBITUSSIN) 100 MG/5ML oral solution 100 mg       CONSULTS:  None    MEDICAL DECISION MAKING / ASSESSMENT / Nisha Danette is a 19 y.o. male presenting with concern for nonproductive cough and headache that began earlier today, along with feeling feverish. Patient is overall very well-appearing with normal vitals, is afebrile, not tachycardic, and has normal O2 saturation on room air. Patient has no increased work of breathing on exam.  Patient did not receive his Covid vaccination, so is at risk for developing Covid. Chest x-ray was ordered along with Covid swab which was sent. Patient was given 1000 mg Tylenol for symptoms along with guaifenesin syrup. Ambulatory pulse ox remained greater than 95% on room air. Patient stated that he was feeling better after receiving those medications. Discussed chest x-ray with patient and that Covid test is pending at this time. Discussed that patient should self quarantine until receiving results of this test and for 10 days from symptom onset if positive. Patient was encouraged to take Tylenol or ibuprofen at home as needed for headache, drink plenty of fluids, and take over-the-counter cough medicine to help with symptomatic management. Patient was given strict return precautions regarding development of chest pain, difficulty breathing, or intractable headache. This patient was evaluated by myself independently. All care plans were discussed and agreed upon. Clinical Impression     1.  Cough    2. Acute nonintractable headache, unspecified headache type        Disposition     PATIENT REFERRED TO:  No follow-up provider specified.     DISCHARGE MEDICATIONS:  Discharge Medication List as of 9/29/2021  8:50 PM          DISPOSITION          Mulugeta Pat PA-C  09/30/21 0037

## 2021-09-30 ENCOUNTER — CARE COORDINATION (OUTPATIENT)
Dept: CARE COORDINATION | Age: 21
End: 2021-09-30

## 2021-09-30 LAB — SARS-COV-2: DETECTED

## 2021-09-30 NOTE — CARE COORDINATION
Outreach attempt regarding recent ED Visit. Patient was unable to reach, ACM unable to leave contact information d/t no current contact number. Outreach attempt 2 regarding recent ED Visit.      [facility]  [phone]  [address] [place] [address] Primary [facility]

## 2021-10-01 ENCOUNTER — CARE COORDINATION (OUTPATIENT)
Dept: CARE COORDINATION | Age: 21
End: 2021-10-01

## 2021-10-01 NOTE — CARE COORDINATION
Outreach attempt 2 regarding recent ED Visit. Patient was unable to reach, ACM unable to leave contact information d/t no current contact number. No further outreach scheduled with this CTN/ACM. Episode of Care resolved.     [facility]  [phone]  [facility] [address] Primary [facility]

## 2022-03-10 ENCOUNTER — HOSPITAL ENCOUNTER (EMERGENCY)
Age: 22
Discharge: HOME | End: 2022-03-10
Attending: EMERGENCY MEDICINE
Payer: COMMERCIAL

## 2022-03-10 VITALS
WEIGHT: 180.1 LBS | RESPIRATION RATE: 18 BRPM | OXYGEN SATURATION: 98 % | HEIGHT: 66 IN | TEMPERATURE: 98.1 F | HEART RATE: 89 BPM | DIASTOLIC BLOOD PRESSURE: 88 MMHG | SYSTOLIC BLOOD PRESSURE: 134 MMHG | BODY MASS INDEX: 28.94 KG/M2

## 2022-03-10 DIAGNOSIS — R56.9 SEIZURE (HCC): Primary | ICD-10-CM

## 2022-03-10 LAB
ANION GAP SERPL CALCULATED.3IONS-SCNC: 10 MMOL/L (ref 3–16)
BUN BLDV-MCNC: 16 MG/DL (ref 7–20)
CALCIUM SERPL-MCNC: 9.8 MG/DL (ref 8.3–10.6)
CHLORIDE BLD-SCNC: 102 MMOL/L (ref 99–110)
CO2: 26 MMOL/L (ref 21–32)
CREAT SERPL-MCNC: 0.8 MG/DL (ref 0.9–1.3)
GFR AFRICAN AMERICAN: >60
GFR NON-AFRICAN AMERICAN: >60
GLUCOSE BLD-MCNC: 89 MG/DL (ref 70–99)
POTASSIUM SERPL-SCNC: 4.5 MMOL/L (ref 3.5–5.1)
SODIUM BLD-SCNC: 138 MMOL/L (ref 136–145)
VALPROIC ACID LEVEL: <2.8 UG/ML (ref 50–100)

## 2022-03-10 PROCEDURE — 80177 DRUG SCRN QUAN LEVETIRACETAM: CPT

## 2022-03-10 PROCEDURE — 80048 BASIC METABOLIC PNL TOTAL CA: CPT

## 2022-03-10 PROCEDURE — 36415 COLL VENOUS BLD VENIPUNCTURE: CPT

## 2022-03-10 PROCEDURE — 80164 ASSAY DIPROPYLACETIC ACD TOT: CPT

## 2022-03-10 PROCEDURE — 99285 EMERGENCY DEPT VISIT HI MDM: CPT

## 2022-03-10 RX ORDER — LEVETIRACETAM 500 MG/1
500 TABLET, EXTENDED RELEASE ORAL DAILY
COMMUNITY
Start: 2022-02-15

## 2022-03-10 ASSESSMENT — ENCOUNTER SYMPTOMS
SHORTNESS OF BREATH: 0
VOMITING: 0
NAUSEA: 0

## 2022-03-11 LAB
KEPPRA DOSE AMT: ABNORMAL
KEPPRA: <2 UG/ML (ref 6–46)

## 2022-03-11 NOTE — ED PROVIDER NOTES
[address] [facility]          ATTENDING PHYSICIAN NOTE       Date of evaluation: 3/10/2022    Chief Complaint     Seizures (Seizure while playing basketball. Hx of seizures. Pt reports taking his home meds. Pt is still mildly postictal. )      History of Present Illness     Carlos Moya is a 23 y.o. male with a history of seizures since childhood who presents after a seizure. The patient was playing basketball and then apparently had a seizure that he does not recall and EMS was called to the scene and brought him to the hospital.  The patient states that he may not be taking his seizure medications exactly as they are prescribed. He is actually not sure all of the seizure medications that he is on at this time. He has a mild headache at this time but denies any injury as a result of the seizure. He is slowly coming back to normal but feels very tired. He has no other complaints. He denies any weakness or numbness. Review of Systems     Review of Systems   Constitutional: Negative for chills and fever. Eyes: Negative for visual disturbance. Respiratory: Negative for shortness of breath. Gastrointestinal: Negative for nausea and vomiting. Neurological: Positive for seizures and headaches. Negative for speech difficulty, weakness and numbness. All other systems reviewed and are negative. Past Medical, Surgical, Family, and Social History     He has a past medical history of ADHD (attention deficit hyperactivity disorder), Cognitive disorder, COVID, Depression, Oppositional defiant disorder, and Symptomatic localization-related epilepsy ([facility] [address] He has no past surgical history on file. His family history is not on file. He reports that he has never smoked. He has never used smokeless tobacco. He reports previous drug use. He reports that he does not drink alcohol.     Medications     Discharge Medication List as of 3/10/2022  7:22 PM      CONTINUE these medications which have NOT CHANGED    Details   levETIRAcetam (KEPPRA XR) 500 MG TB24 extended release tablet Take 500 mg by mouth dailyHistorical Med      ibuprofen (ADVIL;MOTRIN) 800 MG tablet Take 1 tablet by mouth every 8 hours as needed for Pain, Disp-30 tablet, R-0Print      paliperidone (INVEGA) 3 MG extended release tablet Take 1 tablet by mouth nightlyHistorical Med      Cholecalciferol (VITAMIN D3) 1000 units TABS Take 1 tablet by mouth 2 times dailyHistorical Med      divalproex (DEPAKOTE ER) 500 MG extended release tablet Take 4 tablets by mouth every morningHistorical Med      OXcarbazepine (TRILEPTAL) 300 MG tablet Take 2 tablets by mouth 2 times dailyHistorical Med      diazePAM (DIASTAT ADULT RE) Place 12.5 mg rectally For seizures lasting longer than 5 minutes or more than 1 seizure in 8 hours. Historical Med      ARIPiprazole (ABILIFY) 10 MG tablet Take 10 mg by mouth daily             Allergies     He is allergic to methadone and methylphenidate hcl. Physical Exam     INITIAL VITALS: BP: 134/88, Temp: 98.1 °F (36.7 °C), Pulse: 89, Resp: 18, SpO2: 98 %   Physical Exam  Vitals and nursing note reviewed. Constitutional:       General: He is not in acute distress. Appearance: He is well-developed. He is not diaphoretic. HENT:      Head: Normocephalic and atraumatic. Eyes:      Extraocular Movements: Extraocular movements intact. Pupils: Pupils are equal, round, and reactive to light. Cardiovascular:      Rate and Rhythm: Normal rate and regular rhythm. Pulmonary:      Effort: Pulmonary effort is normal.      Breath sounds: Normal breath sounds. Abdominal:      General: There is no distension. Palpations: Abdomen is soft. Tenderness: There is no abdominal tenderness. Musculoskeletal:      Cervical back: Normal range of motion. Skin:     General: Skin is warm and dry. Findings: No rash. Neurological:      General: No focal deficit present.       Mental Status: He is alert and oriented to person, place, and time. Cranial Nerves: No cranial nerve deficit. Motor: No weakness. Psychiatric:         Behavior: Behavior normal.         Diagnostic Results     RADIOLOGY:  No orders to display       LABS:   Results for orders placed or performed during the hospital encounter of 03/10/22   Levetiracetam Level   Result Value Ref Range    KEPPRA Dose Amt Unknown    Valproic Acid Level, Total   Result Value Ref Range    Valproic Acid Lvl <2.8 (L) 50.0 - 100.0 ug/mL   Basic Metabolic Panel   Result Value Ref Range    Sodium 138 136 - 145 mmol/L    Potassium 4.5 3.5 - 5.1 mmol/L    Chloride 102 99 - 110 mmol/L    CO2 26 21 - 32 mmol/L    Anion Gap 10 3 - 16    Glucose 89 70 - 99 mg/dL    BUN 16 7 - 20 mg/dL    CREATININE 0.8 (L) 0.9 - 1.3 mg/dL    GFR Non-African American >60 >60    GFR African American >60 >60    Calcium 9.8 8.3 - 10.6 mg/dL     RECENT VITALS:  BP: 134/88,Temp: 98.1 °F (36.7 °C), Pulse: 89, Resp: 18, SpO2: 98 %     ED Course     Nursing Notes, Past Medical Hx, Past Surgical Hx, Social Hx,Allergies, and Family Hx were reviewed. patient was given the following medications:  No orders of the defined types were placed in this encounter. CONSULTS:  None    MEDICAL DECISIONMAKING / ASSESSMENT / Jewel Ramirez is a 23 y.o. male presenting after seizure with known seizure disorder. When confronted with the information that his Depakote level was undetectable, he did then admit that he is not taking his medications properly which is likely the reason he had a breakthrough seizure. The patient was advised to take his medications as prescribed and he does believe he has a follow-up appointment with a neurologist coming up in the short-term future. Labs here were unremarkable including glucose and sodium and as result he was felt to be stable for discharge. Clinical Impression     1.  Seizure [facility])        Disposition     PATIENT REFERRED TO:  Kimberlee Giang [facility].  [address] [facility] [address] [phone]            DISCHARGE MEDICATIONS:  Discharge Medication List as of 3/10/2022  7:22 PM          DISPOSITION Decision To Discharge 03/10/2022 07:17:12 PM         Jonnie Lantigua MD  03/10/22 2035

## 2022-03-11 NOTE — ED NOTES
Pt was explained discharge instructions and questions where answered.       Ilia Mcnair RN  03/10/22 0324

## 2022-08-11 ENCOUNTER — HOSPITAL ENCOUNTER (EMERGENCY)
Age: 22
Discharge: HOME | End: 2022-08-11
Attending: EMERGENCY MEDICINE
Payer: COMMERCIAL

## 2022-08-11 VITALS
HEART RATE: 67 BPM | DIASTOLIC BLOOD PRESSURE: 75 MMHG | WEIGHT: 177.1 LBS | TEMPERATURE: 98.1 F | RESPIRATION RATE: 23 BRPM | SYSTOLIC BLOOD PRESSURE: 114 MMHG | OXYGEN SATURATION: 100 %

## 2022-08-11 DIAGNOSIS — R56.9 SEIZURE (HCC): Primary | ICD-10-CM

## 2022-08-11 DIAGNOSIS — G40.909 SEIZURE DISORDER (HCC): ICD-10-CM

## 2022-08-11 LAB
GLUCOSE BLD-MCNC: 75 MG/DL (ref 70–99)
PERFORMED ON: NORMAL

## 2022-08-11 PROCEDURE — 6370000000 HC RX 637 (ALT 250 FOR IP): Performed by: STUDENT IN AN ORGANIZED HEALTH CARE EDUCATION/TRAINING PROGRAM

## 2022-08-11 PROCEDURE — 99283 EMERGENCY DEPT VISIT LOW MDM: CPT

## 2022-08-11 RX ORDER — OXCARBAZEPINE 300 MG/1
600 TABLET, FILM COATED ORAL ONCE
Status: COMPLETED | OUTPATIENT
Start: 2022-08-11 | End: 2022-08-11

## 2022-08-11 RX ORDER — LEVETIRACETAM 100 MG/ML
500 SOLUTION ORAL ONCE
Status: COMPLETED | OUTPATIENT
Start: 2022-08-11 | End: 2022-08-11

## 2022-08-11 RX ADMIN — OXCARBAZEPINE 600 MG: 300 TABLET, FILM COATED ORAL at 22:34

## 2022-08-11 RX ADMIN — LEVETIRACETAM 500 MG: 100 SOLUTION ORAL at 22:35

## 2022-08-11 ASSESSMENT — PAIN - FUNCTIONAL ASSESSMENT: PAIN_FUNCTIONAL_ASSESSMENT: NONE - DENIES PAIN

## 2022-08-11 ASSESSMENT — ENCOUNTER SYMPTOMS
RESPIRATORY NEGATIVE: 1
GASTROINTESTINAL NEGATIVE: 1
ALLERGIC/IMMUNOLOGIC NEGATIVE: 1
EYES NEGATIVE: 1

## 2022-08-11 NOTE — ED PROVIDER NOTES
[address] [facility] RESIDENT NOTE     Date of Evaluation: 8/11/2022    Chief Complaint     Seizures (Hx of states only missed today dose. Witnessed when playing basketball. Denies any loss of bowel or bladder and there is no sign of any injury to tounge/mouth. )    History of Present Illness     Whitney Hoff is a 23 y.o. male with PMHx seizure disorder (on Trileptal 300 mg twice daily) who presents with seizure. Patient reports his seizure disorder is managed here at [facility], [facility]. and that he takes Trileptal 300 mg twice daily for his seizure disorder. He reports he has been taking his medications prescribed though he missed a dose this morning as he has been under significant mental stress and has not been sleeping well. He reports he typically gets seizures if he misses doses or has decreased sleep. Prior to arrival to the ED, he was playing basketball and had a witnessed seizure. He denies any injuries associated with his seizure but reports that his right hand does have some scrapes. He feels like he is back at his mental status baseline. He denies any fevers, chills, sore throat, rhinorrhea, nasal congestion, cough, chest pains, shortness of breath, abdominal pain, dysuria, new rash, headache. Review of Systems     Review of Systems   Constitutional: Negative. HENT: Negative. Eyes: Negative. Respiratory: Negative. Cardiovascular: Negative. Gastrointestinal: Negative. Endocrine: Negative. Genitourinary: Negative. Musculoskeletal: Negative. Allergic/Immunologic: Negative. Neurological:  Positive for seizures. Negative for dizziness, facial asymmetry, speech difficulty, weakness, light-headedness, numbness and headaches. Hematological: Negative. Psychiatric/Behavioral: Negative. Past Medical, Surgical, Family, and Social History     He has no past medical history on file.   He has no past surgical history on file.  His family history is not on file. He     Medications     Previous Medications    No medications on file       Allergies     He has no allergies on file. Physical Exam     INITIAL VITALS:   BP: 125/70, Temp: 98.1 °F (36.7 °C), Heart Rate: 90, Resp: 18, SpO2: 97 %     General: 23 y.o. male, well-appearing; in no apparent distress. Head: Normocephalic, atraumatic. Eyes: Anicteric. PERRL, EOMI.  ENT: No nasal discharge. Mucous membranes are moist.  Neck: Supple, full ROM, trachea midline. Pulmonary: Non-labored breathing. Lungs clear to auscultation bilaterally with symmetric aeration. No wheezes/rales/rhonchi. Cardiac: Regular rate and rhythm. No murmurs/rubs/gallops. Abdomen: Soft, non-tender, non-distended. No rebound or guarding. Musculoskeletal: No long bone extremity deformity. No midline cervical, thoracic, lumbar spinal tenderness to palpation. No tenderness to palpation throughout bilateral upper and lower extremities. Full ROM about bilateral upper and lower extremities. Superficial abrasions to knuckles on right hand. Extremities: Warm and well-perfused. No peripheral edema. Skin: No rashes or bruising. Neuro:   - Mental Status: Alert and oriented to person, place, time, and situation.  - Speech: No slurring, aphasia, or dysarthria. - Cranial Nerves: EOMI, PERRLA, palate elevates symmetrically with no uvular deviation, hearing intact and symmetric, sensation intact to light touch in V1-V3 distribution, face symmetric as evidenced by smile and forehead wrinkle, tongue midline, SCM 5/5 bilaterally. - Visual Fields: Full to confrontational testing.  - Sensation: Intact to light touch in all 4 extremities. - Strength: Extension and flexion at elbow, wrist, shoulder, hip, knee, and ankle 5/5 bilaterally. Finger squeeze 5/5 bilaterally. - Coordination: No pronator drift. No truncal ataxia. Finger-to-nose, heel-to-shin intact bilaterally.   - Gait: Able to ambulate independently, not wide-based or high stepping. Psych: Mood and affect appropriate for situation. Diagnostic Results     EKG:  None    RADIOLOGY:  No orders to display     LABS:  Results for orders placed or performed during the hospital encounter of 08/11/22   POCT Glucose   Result Value Ref Range    POC Glucose 75 70 - 99 mg/dl    Performed on ACCU-CHEK        ED BEDSIDE ULTRASOUND:  None    RECENT VITALS:   BP: 125/70, Temp: 98.1 °F (36.7 °C), Heart Rate: 90,Resp: 23, SpO2: 97 %     Procedures     None    ED Course     Nursing Notes, Past Medical Hx, Past Surgical Hx, Social Hx, Allergies, and Family Hx were reviewed. PATIENT GIVEN FOLLOWING MEDICATIONS:  Orders Placed This Encounter   Medications    levETIRAcetam (KEPPRA) 100 MG/ML solution 500 mg    OXcarbazepine (TRILEPTAL) tablet 600 mg       CONSULTS:  None    MEDICAL Lucian Collet / JONE / Candy Jerson is a 23 y.o. male who presents with seizure in the setting of known seizure disorder. Overall, patient seizures most likely secondary to missing a dose of his Trileptal earlier this morning. He denies any other symptoms concerning for an infectious process that could be lowering his seizure threshold and denies any injury secondary to his seizure. Per chart review, patient follows with Dr. Andreas Silverman at [facility] neurology and is currently taking Keppra 500 mg at bedtime and Trileptal 600 mg twice daily. He currently has his medications at home and treated with Keppra and Trileptal here in the emergency department. FSBG normal in the emergency department. He was monitored in the emergency department for 2 hours without any recurrent seizure activity and was able to ambulate independently and tolerate p.o. food and fluids prior to discharge. He was counseled to follow-up with his neurologist for consideration of medication adjustments. He was also counseled to take his medications as prescribed and not to miss any doses.     This patient was also evaluated by the attending physician. All care plans were discussed and agreed upon. Clinical Impression     1. Seizure ([address]    2. Seizure disorder [facility])      Disposition     PATIENT REFERRED TO:  Sydney Quan MD  [address]  [phone]    Schedule an appointment as soon as possible for a visit     DISCHARGE MEDICATIONS:  New Prescriptions    No medications on file     DISPOSITION Discharge - Pending Orders Complete 08/11/2022 10:09:16 PM  At this time the patient has been deemed safe for discharge. Risks, benefits, and alternatives were discussed. My customary discharge instructions including strict return precautions for worsening or new symptoms have been communicated. The patient was advised to follow up with his neurologist.      Garo Lockhart MD, MD   Emergency Medicine, PGY-2    This note was dictated using voice-recognition software, which occasionally leads to inadvertent typographic errors.        Garo Lockhart MD  08/11/22 4481       Garo Lockhart MD  08/11/22 0464

## 2022-08-12 NOTE — DISCHARGE INSTRUCTIONS
You were seen in the emergency department after having a seizure. Your neurologic exam was normal and you were monitored in the emergency department without any recurrent seizures. You were treated with your home antiseizure medications including Keppra and Trileptal.  Please continue to take these medications as prescribed. Please follow-up with your neurologist (Dr. Wilder Rubalcava) in the next 2 weeks regarding medication adjustments. Please return to the emergency room if you develop any new or worsening symptoms, recurrent seizures, fevers, chills, chest pain, shortness of breath, cough, abdominal pain, burning when you pee, or any other symptom that concerns you.

## 2022-08-12 NOTE — ED NOTES
Additional Annelise Mist soda given, patient called father to . Patient requests to wait outside of ED.       Joi Webster RN  08/11/22 5140

## 2022-08-12 NOTE — ED PROVIDER NOTES
ED Attending Attestation Note     Date of evaluation: 8/11/2022    This patient was seen by the resident. I have seen and examined the patient, agree with the workup, evaluation, management and diagnosis. The care plan has been discussed. My assessment reveals an overall well-appearing young gentleman, somewhat sleepy, but conversational, in no acute distress. He has a known history of seizure disorder, followed by Dr. Krysta Nowak of  neurology, and presents today after a witnessed seizure while playing a basketball game. He recalls recently missing a dose of his antiepileptic medication, although he is not able to tell me exactly what medications he takes or what doses. He has small superficial abrasions over the MCP of his right hand, which he states occurred during the seizure. He states that he feels the way he usually does after a seizure, very fatigued, but otherwise well. There was some initial difficulty in the patient's registration, which led to an inability to view his previous records, but appropriate MRN has been discovered, and we were able to view the patient's records from [facility], which indicate t the doses of Tegretol and Keppra that he has prescribed by his neurologist.  He was given doses of these medications here, observed, and is thereafter considered stable for discharge.        Filippo Paz MD  08/11/22 0600

## 2022-09-22 ENCOUNTER — HOSPITAL ENCOUNTER (EMERGENCY)
Age: 22
Discharge: HOME | End: 2022-09-22
Attending: EMERGENCY MEDICINE
Payer: COMMERCIAL

## 2022-09-22 ENCOUNTER — APPOINTMENT (OUTPATIENT)
Dept: GENERAL RADIOLOGY | Age: 22
End: 2022-09-22
Payer: COMMERCIAL

## 2022-09-22 VITALS
WEIGHT: 160 LBS | OXYGEN SATURATION: 100 % | DIASTOLIC BLOOD PRESSURE: 65 MMHG | TEMPERATURE: 97.8 F | RESPIRATION RATE: 16 BRPM | BODY MASS INDEX: 25.71 KG/M2 | SYSTOLIC BLOOD PRESSURE: 123 MMHG | HEIGHT: 66 IN | HEART RATE: 67 BPM

## 2022-09-22 DIAGNOSIS — R56.9 SEIZURE (HCC): Primary | ICD-10-CM

## 2022-09-22 LAB
ANION GAP SERPL CALCULATED.3IONS-SCNC: 12 MMOL/L (ref 3–16)
BASOPHILS ABSOLUTE: 0.1 K/UL (ref 0–0.2)
BASOPHILS RELATIVE PERCENT: 1 %
BUN BLDV-MCNC: 19 MG/DL (ref 7–20)
CALCIUM SERPL-MCNC: 9.7 MG/DL (ref 8.3–10.6)
CHLORIDE BLD-SCNC: 102 MMOL/L (ref 99–110)
CO2: 24 MMOL/L (ref 21–32)
CREAT SERPL-MCNC: 0.9 MG/DL (ref 0.9–1.3)
EOSINOPHILS ABSOLUTE: 0 K/UL (ref 0–0.6)
EOSINOPHILS RELATIVE PERCENT: 0 %
GFR AFRICAN AMERICAN: >60
GFR NON-AFRICAN AMERICAN: >60
GLUCOSE BLD-MCNC: 73 MG/DL (ref 70–99)
HCT VFR BLD CALC: 43.1 % (ref 40.5–52.5)
HEMOGLOBIN: 14.8 G/DL (ref 13.5–17.5)
LYMPHOCYTES ABSOLUTE: 0.7 K/UL (ref 1–5.1)
LYMPHOCYTES RELATIVE PERCENT: 13.4 %
MCH RBC QN AUTO: 29.3 PG (ref 26–34)
MCHC RBC AUTO-ENTMCNC: 34.3 G/DL (ref 31–36)
MCV RBC AUTO: 85.2 FL (ref 80–100)
MONOCYTES ABSOLUTE: 0.2 K/UL (ref 0–1.3)
MONOCYTES RELATIVE PERCENT: 4.2 %
NEUTROPHILS ABSOLUTE: 4.4 K/UL (ref 1.7–7.7)
NEUTROPHILS RELATIVE PERCENT: 81.4 %
PDW BLD-RTO: 15.6 % (ref 12.4–15.4)
PLATELET # BLD: 322 K/UL (ref 135–450)
PMV BLD AUTO: 7.5 FL (ref 5–10.5)
POTASSIUM REFLEX MAGNESIUM: 4.1 MMOL/L (ref 3.5–5.1)
RBC # BLD: 5.06 M/UL (ref 4.2–5.9)
SODIUM BLD-SCNC: 138 MMOL/L (ref 136–145)
WBC # BLD: 5.4 K/UL (ref 4–11)

## 2022-09-22 PROCEDURE — 6360000002 HC RX W HCPCS

## 2022-09-22 PROCEDURE — 80048 BASIC METABOLIC PNL TOTAL CA: CPT

## 2022-09-22 PROCEDURE — 6370000000 HC RX 637 (ALT 250 FOR IP)

## 2022-09-22 PROCEDURE — 90471 IMMUNIZATION ADMIN: CPT

## 2022-09-22 PROCEDURE — 85025 COMPLETE CBC W/AUTO DIFF WBC: CPT

## 2022-09-22 PROCEDURE — 73130 X-RAY EXAM OF HAND: CPT

## 2022-09-22 PROCEDURE — 90715 TDAP VACCINE 7 YRS/> IM: CPT

## 2022-09-22 PROCEDURE — 99284 EMERGENCY DEPT VISIT MOD MDM: CPT

## 2022-09-22 RX ORDER — OXCARBAZEPINE 300 MG/1
300 TABLET, FILM COATED ORAL ONCE
Status: COMPLETED | OUTPATIENT
Start: 2022-09-22 | End: 2022-09-22

## 2022-09-22 RX ORDER — BACITRACIN ZINC AND POLYMYXIN B SULFATE 500; 1000 [USP'U]/G; [USP'U]/G
OINTMENT TOPICAL ONCE
Status: COMPLETED | OUTPATIENT
Start: 2022-09-22 | End: 2022-09-22

## 2022-09-22 RX ADMIN — BACITRACIN ZINC AND POLYMYXIN B SULFATE: 500; 10000 OINTMENT TOPICAL at 21:24

## 2022-09-22 RX ADMIN — OXCARBAZEPINE 300 MG: 300 TABLET, FILM COATED ORAL at 22:21

## 2022-09-22 RX ADMIN — TETANUS TOXOID, REDUCED DIPHTHERIA TOXOID AND ACELLULAR PERTUSSIS VACCINE, ADSORBED 0.5 ML: 5; 2.5; 8; 8; 2.5 SUSPENSION INTRAMUSCULAR at 21:23

## 2022-09-22 ASSESSMENT — ENCOUNTER SYMPTOMS
COUGH: 0
RHINORRHEA: 0
ABDOMINAL PAIN: 0
EYE DISCHARGE: 0
VOMITING: 0
CONSTIPATION: 0
BACK PAIN: 0
NAUSEA: 0
DIARRHEA: 0
SHORTNESS OF BREATH: 0
EYE PAIN: 0
EYE ITCHING: 0

## 2022-09-22 ASSESSMENT — PAIN - FUNCTIONAL ASSESSMENT: PAIN_FUNCTIONAL_ASSESSMENT: NONE - DENIES PAIN

## 2022-09-22 NOTE — ED PROVIDER NOTES
[address] [facility] [address] ENCOUNTER          PHYSICIAN ASSISTANT NOTE       Date of evaluation: 9/22/2022    Chief Complaint     Seizures (Pt was playing basketball when he had a seizure. Witnessed by staff. EMS states they were told it lasted a few minutes. Pt has long history of seizures and denies missing dose of seizure medication. )      History of Present Illness     Sissy Hong is a 23 y.o. male with a history of seizures who presents to the emergency department with a chief complaint of a seizure. The patient has a longstanding history of seizures and is on Depakote and Trileptal.  The patient states that he last had a seizure about a month ago. He does admit to missing 2 doses of his Trileptal due to running out of this medication. He states that it is being delivered to his house tomorrow. He has been taking his Depakote as prescribed. He follows with  neurology. He is currently only complaining of some pain in the heads of his right metacarpals but otherwise does not complain of any pain. He denies any headache, head trauma, chest pain, abdominal pain. He states he was playing basketball when this happened. Per EMS, lasted a couple of minutes. The patient states he is feels a little bit fatigued but overall feels normal.  He denies any recent infectious symptoms such as fever, chills, abdominal pain, myalgias, rhinorrhea, congestion, coughing, dysuria    Review of Systems     Review of Systems   Constitutional:  Positive for fatigue. Negative for chills and fever. HENT:  Negative for congestion, drooling and rhinorrhea. Eyes:  Negative for pain, discharge and itching. Respiratory:  Negative for cough and shortness of breath. Cardiovascular:  Negative for chest pain, palpitations and leg swelling. Gastrointestinal:  Negative for abdominal pain, constipation, diarrhea, nausea and vomiting. Genitourinary:  Negative for dysuria, flank pain and hematuria. Musculoskeletal:  Negative for back pain and myalgias. Skin:  Negative for rash. Neurological:  Positive for seizures. Negative for dizziness, facial asymmetry, weakness, light-headedness and headaches. Psychiatric/Behavioral:  Negative for confusion. Past Medical, Surgical, Family, and Social History     He has no past medical history on file. He has no past surgical history on file. His family history is not on file. He     Medications     There are no discharge medications for this patient. Allergies     He has No Known Allergies. Physical Exam     INITIAL VITALS: BP: 128/72, Temp: 97.8 °F (36.6 °C), Heart Rate: 76, Resp: 16, SpO2: 99 %  Physical Exam  Constitutional:       General: He is not in acute distress. Appearance: Normal appearance. He is normal weight. He is not ill-appearing, toxic-appearing or diaphoretic. HENT:      Head: Normocephalic and atraumatic. Right Ear: External ear normal.      Left Ear: External ear normal.      Nose: Nose normal.      Mouth/Throat:      Mouth: Mucous membranes are moist.   Eyes:      Extraocular Movements: Extraocular movements intact. Pupils: Pupils are equal, round, and reactive to light. Cardiovascular:      Rate and Rhythm: Normal rate and regular rhythm. Pulses: Normal pulses. Heart sounds: Normal heart sounds. No murmur heard. No gallop. Pulmonary:      Effort: Pulmonary effort is normal. No respiratory distress. Breath sounds: Normal breath sounds. No wheezing, rhonchi or rales. Abdominal:      General: Abdomen is flat. Bowel sounds are normal. There is no distension. Palpations: Abdomen is soft. Tenderness: There is no abdominal tenderness. There is no guarding or rebound. Musculoskeletal:      Cervical back: Normal range of motion and neck supple. Comments: No tenderness to palpation of the midline C/T/L-spine. No tenderness to palpation of the chest, abdomen, pelvis.   Negative logroll bilaterally. No tenderness to palpation of the bilateral upper and lower extremities with the exception of tenderness to palpation overlying the second and third right metacarpal heads with overlying abrasions. Normal range of motion, strength, 2+ radial pulse, sensation normal   Neurological:      Mental Status: He is alert. Comments: Alert and oriented x3. Cranial nerves II through XII are gross intact bilaterally. Facial movements are symmetric, pupils are equal and reactive to light, extraocular movements are intact. Moving extremity spontaneously and equally. Sensation intact bilaterally. Psychiatric:         Mood and Affect: Mood normal.         Behavior: Behavior normal.       Diagnostic Results         RADIOLOGY:  XR HAND RIGHT (MIN 3 VIEWS)   Final Result      1. Soft tissue swelling.           LABS:   Results for orders placed or performed during the hospital encounter of 09/22/22   CBC with Auto Differential   Result Value Ref Range    WBC 5.4 4.0 - 11.0 K/uL    RBC 5.06 4.20 - 5.90 M/uL    Hemoglobin 14.8 13.5 - 17.5 g/dL    Hematocrit 43.1 40.5 - 52.5 %    MCV 85.2 80.0 - 100.0 fL    MCH 29.3 26.0 - 34.0 pg    MCHC 34.3 31.0 - 36.0 g/dL    RDW 15.6 (H) 12.4 - 15.4 %    Platelets 400 788 - 968 K/uL    MPV 7.5 5.0 - 10.5 fL    Neutrophils % 81.4 %    Lymphocytes % 13.4 %    Monocytes % 4.2 %    Eosinophils % 0.0 %    Basophils % 1.0 %    Neutrophils Absolute 4.4 1.7 - 7.7 K/uL    Lymphocytes Absolute 0.7 (L) 1.0 - 5.1 K/uL    Monocytes Absolute 0.2 0.0 - 1.3 K/uL    Eosinophils Absolute 0.0 0.0 - 0.6 K/uL    Basophils Absolute 0.1 0.0 - 0.2 K/uL   BMP w/ Reflex to MG   Result Value Ref Range    Sodium 138 136 - 145 mmol/L    Potassium reflex Magnesium 4.1 3.5 - 5.1 mmol/L    Chloride 102 99 - 110 mmol/L    CO2 24 21 - 32 mmol/L    Anion Gap 12 3 - 16    Glucose 73 70 - 99 mg/dL    BUN 19 7 - 20 mg/dL    Creatinine 0.9 0.9 - 1.3 mg/dL    GFR Non-African American >60 >60    GFR  American >60 >60    Calcium 9.7 8.3 - 10.6 mg/dL       ED BEDSIDE ULTRASOUND:  No results found. RECENT VITALS:  BP: 123/65, Temp: 97.8 °F (36.6 °C), Heart Rate: 67, Resp: 16, SpO2: 100 %     Procedures   none    ED Course     Nursing Notes, Past Medical Hx,Past Surgical Hx, Social Hx, Allergies, and Family Hx were reviewed. The patient was given the following medications:  Orders Placed This Encounter   Medications    Tetanus-Diphth-Acell Pertussis (BOOSTRIX) injection 0.5 mL    bacitracin-polymyxin b (POLYSPORIN) ointment    OXcarbazepine (TRILEPTAL) tablet 300 mg       CONSULTS:  None    MEDICAL Nivia Marker / ASSESSMENT / Venancio Bajwa is a 23 y.o. male with a history of seizures presenting with chief complaint of a seizure. The patient states that he is on Depakote and Trileptal.  He does admit to missing 2 doses of Trileptal because he ran out of his medication. The Trileptal is arriving tomorrow morning per delivery service. His only complaint currently is some pain over his right hand. On my exam, the patient is hemodynamically stable and afebrile. He is no acute distress. Neurologic exam is normal.  He does have some abrasions overlying the second and third metacarpal heads of the right hand but no gross deformities, normal sensation, normal strength and 2+ radial pulse. No other evidence of trauma. No evidence of head trauma. I suspect his breakthrough seizure is likely related to missing a couple of doses of his antiepileptic medications. I did obtain CBC, BMP, right hand x-ray. I do not feel further work-up is necessary given lack of significant trauma on physical exam and given that we do have an explanation for his breakthrough seizure. The patient was given a tetanus booster and Polysporin was applied to the wounds. Radiograph of the right hand reveals no acute osseous abnormalities.   CBC and BMP are normal.  The patient was given a dose of Trileptal in the emergency department. He was monitored for period of 2-1/2 to 3 hours and maintained normal neurologic status. He was instructed to make sure that he is taking all of his intake epileptic medications as prescribed. He did reiterate that he will be getting his Trileptal medication refill in the mail tomorrow morning. The patient was given strict return precautions and instructed to follow-up with neurology. He verbalizes understanding and was discharged in stable condition. This patient was also evaluated by the attending physician. All care plans were discussed and agreed upon. Clinical Impression     1. Seizure [facility])        Disposition     PATIENT [facility]ED [facility]:  [facility]  [address]  [phone]  Go to   As needed, If symptoms worsen      Please follow-up with your neurologist        DISCHARGE MEDICATIONS:  There are no discharge medications for this patient.       DISPOSITION Decision To Discharge 09/22/2022 09:54:23 PM       Jeferson Stevens  09/23/22 9284

## 2022-09-23 NOTE — DISCHARGE INSTRUCTIONS
Carlos, you had a breakthrough seizure likely related to missing a couple of doses of your Trileptal.  Please start taking this as it is you get this in the mail tomorrow. Please continue taking your other antiepileptic medication, Depakote as prescribed. Your x-ray of your hand looks normal.  You have no evidence of infection. Please schedule an appointment with your neurologist for follow-up.

## 2022-09-23 NOTE — ED NOTES
Discharge instructions given. Verbalized understanding. Denies further questions or concerns. A&OX4. Pt ambulates from ED with steady gait.       Urban Hair RN  09/22/22 8171

## 2022-10-19 NOTE — ED PROVIDER NOTES
ED Attending Attestation Note     Date of evaluation: 9/22/2022    This patient was seen by the advance practice provider. I have seen and examined the patient, agree with the workup, evaluation, management and diagnosis. The care plan has been discussed. I have reviewed the ECG and concur with the KRISTOFER's interpretation. My assessment reveals well appearing young male, alert and oriented to self and situation, confused about events of seizure. Symmetrical facial muscle movements. No dysarthria or aphasia. PERRLA, EOMI. Normal equal strength testing bilaterally in all of the large muscle groups. No rigidity or clonus.      Katerina Guillaume MD  10/19/22 6092

## 2023-01-04 ENCOUNTER — HOSPITAL ENCOUNTER (EMERGENCY)
Age: 23
Discharge: HOME | End: 2023-01-04
Attending: EMERGENCY MEDICINE
Payer: COMMERCIAL

## 2023-01-04 VITALS
OXYGEN SATURATION: 99 % | HEIGHT: 66 IN | HEART RATE: 69 BPM | WEIGHT: 132 LBS | RESPIRATION RATE: 18 BRPM | TEMPERATURE: 98.8 F | BODY MASS INDEX: 21.21 KG/M2 | SYSTOLIC BLOOD PRESSURE: 139 MMHG | DIASTOLIC BLOOD PRESSURE: 94 MMHG

## 2023-01-04 DIAGNOSIS — R42 LIGHTHEADED: ICD-10-CM

## 2023-01-04 DIAGNOSIS — G40.909 SEIZURE DISORDER (HCC): Primary | ICD-10-CM

## 2023-01-04 LAB
ANION GAP SERPL CALCULATED.3IONS-SCNC: 10 MMOL/L (ref 3–16)
BASOPHILS ABSOLUTE: 0 K/UL (ref 0–0.2)
BASOPHILS RELATIVE PERCENT: 0.6 %
BILIRUBIN URINE: NEGATIVE
BLOOD, URINE: NEGATIVE
BUN BLDV-MCNC: 17 MG/DL (ref 7–20)
CALCIUM SERPL-MCNC: 9.7 MG/DL (ref 8.3–10.6)
CHLORIDE BLD-SCNC: 101 MMOL/L (ref 99–110)
CLARITY: CLEAR
CO2: 27 MMOL/L (ref 21–32)
COLOR: YELLOW
CREAT SERPL-MCNC: 0.8 MG/DL (ref 0.9–1.3)
EKG ATRIAL RATE: 60 BPM
EKG ATRIAL RATE: 63 BPM
EKG DIAGNOSIS: NORMAL
EKG DIAGNOSIS: NORMAL
EKG P AXIS: 36 DEGREES
EKG P AXIS: 42 DEGREES
EKG P-R INTERVAL: 140 MS
EKG P-R INTERVAL: 162 MS
EKG Q-T INTERVAL: 398 MS
EKG Q-T INTERVAL: 422 MS
EKG QRS DURATION: 102 MS
EKG QRS DURATION: 96 MS
EKG QTC CALCULATION (BAZETT): 407 MS
EKG QTC CALCULATION (BAZETT): 422 MS
EKG R AXIS: 60 DEGREES
EKG R AXIS: 7 DEGREES
EKG T AXIS: -1 DEGREES
EKG T AXIS: 12 DEGREES
EKG VENTRICULAR RATE: 60 BPM
EKG VENTRICULAR RATE: 63 BPM
EOSINOPHILS ABSOLUTE: 0 K/UL (ref 0–0.6)
EOSINOPHILS RELATIVE PERCENT: 0 %
EPITHELIAL CELLS, UA: ABNORMAL /HPF (ref 0–5)
GFR SERPL CREATININE-BSD FRML MDRD: >60 ML/MIN/{1.73_M2}
GLUCOSE BLD-MCNC: 100 MG/DL (ref 70–99)
GLUCOSE URINE: NEGATIVE MG/DL
HCT VFR BLD CALC: 45.4 % (ref 40.5–52.5)
HEMOGLOBIN: 15.1 G/DL (ref 13.5–17.5)
KETONES, URINE: ABNORMAL MG/DL
LEUKOCYTE ESTERASE, URINE: NEGATIVE
LYMPHOCYTES ABSOLUTE: 1.2 K/UL (ref 1–5.1)
LYMPHOCYTES RELATIVE PERCENT: 25.7 %
MCH RBC QN AUTO: 28.1 PG (ref 26–34)
MCHC RBC AUTO-ENTMCNC: 33.2 G/DL (ref 31–36)
MCV RBC AUTO: 84.9 FL (ref 80–100)
MICROSCOPIC EXAMINATION: ABNORMAL
MONOCYTES ABSOLUTE: 0.2 K/UL (ref 0–1.3)
MONOCYTES RELATIVE PERCENT: 4.8 %
MUCUS: ABNORMAL /LPF
NEUTROPHILS ABSOLUTE: 3.2 K/UL (ref 1.7–7.7)
NEUTROPHILS RELATIVE PERCENT: 68.9 %
NITRITE, URINE: NEGATIVE
PDW BLD-RTO: 14.7 % (ref 12.4–15.4)
PH UA: 6.5 (ref 5–8)
PLATELET # BLD: 307 K/UL (ref 135–450)
PMV BLD AUTO: 7.5 FL (ref 5–10.5)
POTASSIUM REFLEX MAGNESIUM: 5.1 MMOL/L (ref 3.5–5.1)
PROTEIN UA: NEGATIVE MG/DL
RAPID INFLUENZA  B AGN: NEGATIVE
RAPID INFLUENZA A AGN: NEGATIVE
RBC # BLD: 5.35 M/UL (ref 4.2–5.9)
RBC UA: ABNORMAL /HPF (ref 0–4)
SARS-COV-2, NAAT: NOT DETECTED
SODIUM BLD-SCNC: 138 MMOL/L (ref 136–145)
SPECIFIC GRAVITY UA: 1.02 (ref 1–1.03)
TROPONIN: <0.01 NG/ML
TROPONIN: <0.01 NG/ML
URINE TYPE: ABNORMAL
UROBILINOGEN, URINE: 1 E.U./DL
VALPROIC ACID LEVEL: <2.8 UG/ML (ref 50–100)
WBC # BLD: 4.7 K/UL (ref 4–11)
WBC UA: ABNORMAL /HPF (ref 0–5)

## 2023-01-04 PROCEDURE — 2580000003 HC RX 258: Performed by: PHYSICIAN ASSISTANT

## 2023-01-04 PROCEDURE — 93005 ELECTROCARDIOGRAM TRACING: CPT | Performed by: EMERGENCY MEDICINE

## 2023-01-04 PROCEDURE — 99284 EMERGENCY DEPT VISIT MOD MDM: CPT

## 2023-01-04 PROCEDURE — 87635 SARS-COV-2 COVID-19 AMP PRB: CPT

## 2023-01-04 PROCEDURE — 80164 ASSAY DIPROPYLACETIC ACD TOT: CPT

## 2023-01-04 PROCEDURE — 36415 COLL VENOUS BLD VENIPUNCTURE: CPT

## 2023-01-04 PROCEDURE — 84484 ASSAY OF TROPONIN QUANT: CPT

## 2023-01-04 PROCEDURE — 80048 BASIC METABOLIC PNL TOTAL CA: CPT

## 2023-01-04 PROCEDURE — 93005 ELECTROCARDIOGRAM TRACING: CPT | Performed by: PHYSICIAN ASSISTANT

## 2023-01-04 PROCEDURE — 87804 INFLUENZA ASSAY W/OPTIC: CPT

## 2023-01-04 PROCEDURE — 85025 COMPLETE CBC W/AUTO DIFF WBC: CPT

## 2023-01-04 PROCEDURE — 81001 URINALYSIS AUTO W/SCOPE: CPT

## 2023-01-04 RX ORDER — SODIUM CHLORIDE, SODIUM LACTATE, POTASSIUM CHLORIDE, AND CALCIUM CHLORIDE .6; .31; .03; .02 G/100ML; G/100ML; G/100ML; G/100ML
1000 INJECTION, SOLUTION INTRAVENOUS ONCE
Status: COMPLETED | OUTPATIENT
Start: 2023-01-04 | End: 2023-01-04

## 2023-01-04 RX ORDER — LEVETIRACETAM 100 MG/ML
SOLUTION ORAL
COMMUNITY
Start: 2022-12-21

## 2023-01-04 RX ADMIN — SODIUM CHLORIDE, POTASSIUM CHLORIDE, SODIUM LACTATE AND CALCIUM CHLORIDE 1000 ML: 600; 310; 30; 20 INJECTION, SOLUTION INTRAVENOUS at 13:29

## 2023-01-04 ASSESSMENT — ENCOUNTER SYMPTOMS
COUGH: 0
TROUBLE SWALLOWING: 0
NAUSEA: 0
BACK PAIN: 0
CHEST TIGHTNESS: 0
FACIAL SWELLING: 0
DIARRHEA: 0
WHEEZING: 0
VOMITING: 0
SORE THROAT: 0
SHORTNESS OF BREATH: 0
RHINORRHEA: 0

## 2023-01-04 ASSESSMENT — PAIN SCALES - GENERAL: PAINLEVEL_OUTOF10: 3

## 2023-01-04 ASSESSMENT — LIFESTYLE VARIABLES
HOW OFTEN DO YOU HAVE A DRINK CONTAINING ALCOHOL: NEVER
HOW MANY STANDARD DRINKS CONTAINING ALCOHOL DO YOU HAVE ON A TYPICAL DAY: PATIENT DOES NOT DRINK

## 2023-01-04 ASSESSMENT — PAIN - FUNCTIONAL ASSESSMENT: PAIN_FUNCTIONAL_ASSESSMENT: 0-10

## 2023-01-04 NOTE — ED PROVIDER NOTES
[address] [facility] ENCOUNTER          PHYSICIAN ASSISTANT NOTE       Date of evaluation: 1/4/2023    Chief Complaint     Seizures      History of Present Illness     Chilo Porras is a 23 y.o. male who presents with complaints of possible seizure. Patient has a history of seizures and has been out of his medications for 2 days. He actually just took his medicines earlier this morning. He was feeling fatigued as well as lightheaded. He states he usually feels like this before seizure and was concerned he was going to have a seizure so came to the emergency department. He does complain of some generalized malaise. Denies headache or head injury. Denies any nausea or vomiting. Denies any chest pain or shortness of breath or cough. Denies any fevers or chills. He states he did not actually have a seizure but just felt like there was one coming on. He did just get his refills for his medications and took them today. Review of Systems     Review of Systems   Constitutional:  Positive for fatigue. Negative for chills and fever. HENT:  Negative for congestion, facial swelling, postnasal drip, rhinorrhea, sore throat and trouble swallowing. Eyes:  Negative for visual disturbance. Respiratory:  Negative for cough, chest tightness, shortness of breath and wheezing. Cardiovascular:  Negative for chest pain, palpitations and leg swelling. Gastrointestinal:  Negative for diarrhea, nausea and vomiting. Genitourinary:  Negative for flank pain and hematuria. Musculoskeletal:  Negative for back pain, myalgias, neck pain and neck stiffness. Skin:  Negative for rash. Neurological:  Positive for light-headedness. Negative for dizziness, syncope, weakness, numbness and headaches. All other systems reviewed and are negative.     Past Medical, Surgical, Family, and Social History     He has a past medical history of ADHD (attention deficit hyperactivity disorder), Cognitive disorder, COVID, Depression, Oppositional defiant disorder, Seizures ([facility] [address] and Symptomatic localization-related epilepsy ([facility] [address] He has a past surgical history that includes Tonsillectomy. His family history is not on file. He reports that he has quit smoking. He has never used smokeless tobacco. He reports that he does not currently use drugs after having used the following drugs: Marijuana [place] [employer]). He reports that he does not drink alcohol. Medications     Previous Medications    ARIPIPRAZOLE (ABILIFY) 10 MG TABLET    Take 10 mg by mouth daily    ARIPIPRAZOLE (ABILIFY) 10 MG TABLET    Take 10 mg by mouth daily    CHOLECALCIFEROL (VITAMIN D3) 1000 UNITS TABS    Take 1 tablet by mouth 2 times daily    DIAZEPAM (DIASTAT ADULT RE)    Place 12.5 mg rectally For seizures lasting longer than 5 minutes or more than 1 seizure in 8 hours. DIVALPROEX (DEPAKOTE ER) 500 MG EXTENDED RELEASE TABLET    Take 4 tablets by mouth every morning    DIVALPROEX (DEPAKOTE ER) 500 MG EXTENDED RELEASE TABLET    Take 500 mg by mouth 2 times daily    IBUPROFEN (ADVIL;MOTRIN) 800 MG TABLET    Take 1 tablet by mouth every 8 hours as needed for Pain    LEVETIRACETAM (KEPPRA XR) 500 MG TB24 EXTENDED RELEASE TABLET    Take 500 mg by mouth daily    LEVETIRACETAM (KEPPRA) 100 MG/ML SOLUTION        ONDANSETRON (ZOFRAN) 4 MG TABLET    Take 1 tablet by mouth every 8 hours as needed for Nausea    OXCARBAZEPINE (TRILEPTAL) 300 MG TABLET    Take 2 tablets by mouth 2 times daily    OXCARBAZEPINE (TRILEPTAL) 300 MG TABLET    Take 300 mg by mouth 2 times daily    PALIPERIDONE (INVEGA) 3 MG EXTENDED RELEASE TABLET    Take 1 tablet by mouth nightly       Allergies     He is allergic to methadone, metadate er [methylphenidate], and methylphenidate hcl. Physical Exam     INITIAL VITALS: BP: (!) 139/94, Temp: 98.8 °F (37.1 °C), Heart Rate: 69, Resp: 18, SpO2: 99 %  Physical Exam  Vitals and nursing note reviewed.    Constitutional:       General: He is not in acute distress. Appearance: Normal appearance. He is well-developed. He is not ill-appearing or diaphoretic. HENT:      Head: Normocephalic and atraumatic. Right Ear: External ear normal.      Left Ear: External ear normal.      Nose: Nose normal.      Mouth/Throat:      Pharynx: Oropharynx is clear. No oropharyngeal exudate. Eyes:      General: No scleral icterus. Conjunctiva/sclera: Conjunctivae normal.      Pupils: Pupils are equal, round, and reactive to light. Neck:      Thyroid: No thyromegaly. Vascular: No JVD. Trachea: No tracheal deviation. Cardiovascular:      Rate and Rhythm: Normal rate and regular rhythm. Pulses: Normal pulses. Heart sounds: Normal heart sounds. No murmur heard. No friction rub. No gallop. Pulmonary:      Effort: Pulmonary effort is normal. No respiratory distress. Breath sounds: Normal breath sounds. No stridor. No wheezing or rales. Chest:      Chest wall: No tenderness. Abdominal:      General: Bowel sounds are normal. There is no distension. Palpations: Abdomen is soft. There is no mass. Tenderness: There is no abdominal tenderness. There is no right CVA tenderness, left CVA tenderness, guarding or rebound. Musculoskeletal:         General: No swelling or tenderness. Normal range of motion. Cervical back: Normal range of motion and neck supple. Lymphadenopathy:      Cervical: No cervical adenopathy. Skin:     General: Skin is warm and dry. Neurological:      General: No focal deficit present. Mental Status: He is alert and oriented to person, place, and time. Mental status is at baseline. Cranial Nerves: No cranial nerve deficit. Sensory: No sensory deficit. Motor: No weakness or abnormal muscle tone.       Coordination: Coordination normal.      Gait: Gait normal.      Deep Tendon Reflexes: Reflexes normal.   Psychiatric:         Behavior: Behavior normal.       Diagnostic Results     EKG   Interpreted in conjunction with emergency department physician Adama Garnett MD  EKG Interpretation  Rhythm: normal sinus   Rate: normal HR 63  Axis: normal  Ectopy: none  Conduction: normal  ST Segments: 1 mm of ST elevation noted in lead I and aVL  T Waves:  inverted T wave in lead III  Q Waves: none    Clinical Impression: Patient had 1 mm ST elevation noted in lead I and aVL with inverted T wave in lead III      EKG Interpretation    Interpreted by DR Wili Casillas  Rhythm: normal sinus   Rate: normal heart rate 60  Axis: normal  Ectopy: none  Conduction: normal  ST Segments: no acute change  T Waves: no acute change  Q Waves: none    Clinical Impression: Repeat EKG was done for abnormal EKG done earlier. This did show normal sinus rhythm with sinus arrhythmia with no ST elevation or T wave inversion. RADIOLOGY:  No orders to display       LABS:   See electronic record    RECENT VITALS:  BP: (!) 139/94, Temp: 98.8 °F (37.1 °C), Heart Rate: 69, Resp: 18, SpO2: 99 %     Procedures     ED Course     Nursing Notes, Past Medical Hx,Past Surgical Hx, Social Hx, Allergies, and Family Hx were reviewed. The patient was given the following medications:  Orders Placed This Encounter   Medications    lactated ringers bolus       CONSULTS:  None    MEDICAL Alex Coventry / ASSESSMENT / Nelly Ip is a 23 y.o. male presents concerning for possible seizure. Patient has a history of seizures and has been off his medications for 2 days but did take his medications today. He states he did get his refills and has his medications now. He is complaining of feeling lightheaded and fatigued which can be a prodrome for his seizures. He denies any chest pain or shortness of breath. Urinalysis showed trace ketones otherwise negative. COVID and influenza were negative. Depakote level negative but he states he does not think he takes this anymore. CBC showed white count 4.7 with hemoglobin 15. 1. BMP was within normal limits with glucose 100 creatinine 0.8. Troponin less than 0.01. Patient's initial EKG was abnormal and did appear to have 1 mm ST elevation in lead I and aVL with T wave inversion in lead III. This patient be turned over to the oncoming KRISTOFER for repeat troponin. Patient has had no chest pain or shortness of breath. EKG is much improved. Pending second troponin negative he will be discharged home. Patient is to follow-up with neurology. Continue taking meds. If chest pain, shortness of breath, fevers, seizure or worsening symptoms return the emergency department. Patient has remained in stable condition here. This patient was also evaluated by the attending physician. All care plans were discussed and agreed upon. Clinical Impression     1. Seizure disorder ([address]    2.  Lightheaded        Disposition     PATIENT REFERRED TO:  your neurologist          [address] - Neurology  [facility] [address]  [phone]        [facility] Emergency Department  [address] [place] [address]  [phone]    If symptoms worsen    DISCHARGE MEDICATIONS:  New Prescriptions    No medications on file       DISPOSITION  pending troponin        Nikita Bharathi [place]  01/04/23 0704

## 2023-01-04 NOTE — ED PROVIDER NOTES
ED Attending Attestation Note     Date of evaluation: 1/4/2023    This patient was seen by the advance practice provider. I have seen and examined the patient, agree with the workup, evaluation, management and diagnosis. The care plan has been discussed. I have reviewed the ECG and concur with the KRISTOFER's interpretation. Briefly, Nilo Carrillo is a 23 y.o. male with a PMH inclusive of seizure disorder who presents for evaluation of feeling like he was about to have a seizure. States he was at his girlfriend's house for the last 2 days and did not have a seizure medication and presented because he felt like he would have a seizure. He has since taken his seizure medications but still feels off. Notable exam findings include no focal deficits. Ambulatory, normal mental status    Assessment/ Medical Decision Making:     Since patient feels off from his baseline, medical workup obtained. If unrevealing, anticipated dc to home with instructions to continue with his seizure medication regimen and follow up with his doctor.            Everette Ramos MD  01/05/23 6247

## 2023-01-04 NOTE — ED TRIAGE NOTES
Pt states on the way to basketball he started to feel dizzy, states he usually just sleeps it off. Pt does have a headache.

## 2023-01-04 NOTE — DISCHARGE INSTRUCTIONS
Take medications as prescribed.   If chest pain, shortness of breath, seizures, fever or worsening symptoms return the emergency department

## 2023-01-04 NOTE — ED TRIAGE NOTES
Pt states he thinks he may have had a seizure, he usually feels weak after his seizures. States he did miss some of his meds but that was 2 weeks ago.

## 2023-02-12 ENCOUNTER — HOSPITAL ENCOUNTER (EMERGENCY)
Age: 23
Discharge: HOME | End: 2023-02-12
Attending: EMERGENCY MEDICINE
Payer: COMMERCIAL

## 2023-02-12 ENCOUNTER — APPOINTMENT (OUTPATIENT)
Dept: GENERAL RADIOLOGY | Age: 23
End: 2023-02-12
Payer: COMMERCIAL

## 2023-02-12 VITALS
HEART RATE: 79 BPM | TEMPERATURE: 98.2 F | BODY MASS INDEX: 26.2 KG/M2 | HEIGHT: 66 IN | DIASTOLIC BLOOD PRESSURE: 79 MMHG | WEIGHT: 163 LBS | OXYGEN SATURATION: 100 % | SYSTOLIC BLOOD PRESSURE: 120 MMHG | RESPIRATION RATE: 18 BRPM

## 2023-02-12 DIAGNOSIS — R56.9 SEIZURE (HCC): Primary | ICD-10-CM

## 2023-02-12 LAB
A/G RATIO: 1.6 (ref 1.1–2.2)
ALBUMIN SERPL-MCNC: 3.9 G/DL (ref 3.4–5)
ALP BLD-CCNC: 72 U/L (ref 40–129)
ALT SERPL-CCNC: 15 U/L (ref 10–40)
ANION GAP SERPL CALCULATED.3IONS-SCNC: 9 MMOL/L (ref 3–16)
AST SERPL-CCNC: 25 U/L (ref 15–37)
BASOPHILS ABSOLUTE: 0 K/UL (ref 0–0.2)
BASOPHILS RELATIVE PERCENT: 0.3 %
BILIRUB SERPL-MCNC: 0.3 MG/DL (ref 0–1)
BILIRUBIN URINE: NEGATIVE
BLOOD, URINE: NEGATIVE
BUN BLDV-MCNC: 14 MG/DL (ref 7–20)
CALCIUM SERPL-MCNC: 9.6 MG/DL (ref 8.3–10.6)
CHLORIDE BLD-SCNC: 101 MMOL/L (ref 99–110)
CLARITY: CLEAR
CO2: 26 MMOL/L (ref 21–32)
COLOR: YELLOW
CREAT SERPL-MCNC: 0.9 MG/DL (ref 0.9–1.3)
EOSINOPHILS ABSOLUTE: 0 K/UL (ref 0–0.6)
EOSINOPHILS RELATIVE PERCENT: 0 %
GFR SERPL CREATININE-BSD FRML MDRD: >60 ML/MIN/{1.73_M2}
GLUCOSE BLD-MCNC: 92 MG/DL (ref 70–99)
GLUCOSE URINE: NEGATIVE MG/DL
HCT VFR BLD CALC: 41 % (ref 40.5–52.5)
HEMOGLOBIN: 13.6 G/DL (ref 13.5–17.5)
KETONES, URINE: NEGATIVE MG/DL
LEUKOCYTE ESTERASE, URINE: NEGATIVE
LYMPHOCYTES ABSOLUTE: 0.9 K/UL (ref 1–5.1)
LYMPHOCYTES RELATIVE PERCENT: 14.7 %
MCH RBC QN AUTO: 27.8 PG (ref 26–34)
MCHC RBC AUTO-ENTMCNC: 33 G/DL (ref 31–36)
MCV RBC AUTO: 84.1 FL (ref 80–100)
MICROSCOPIC EXAMINATION: NORMAL
MONOCYTES ABSOLUTE: 0.3 K/UL (ref 0–1.3)
MONOCYTES RELATIVE PERCENT: 5.9 %
NEUTROPHILS ABSOLUTE: 4.6 K/UL (ref 1.7–7.7)
NEUTROPHILS RELATIVE PERCENT: 79.1 %
NITRITE, URINE: NEGATIVE
PDW BLD-RTO: 15.2 % (ref 12.4–15.4)
PH UA: 6.5 (ref 5–8)
PLATELET # BLD: 272 K/UL (ref 135–450)
PMV BLD AUTO: 7.7 FL (ref 5–10.5)
POTASSIUM REFLEX MAGNESIUM: 4.4 MMOL/L (ref 3.5–5.1)
PROTEIN UA: NEGATIVE MG/DL
RBC # BLD: 4.88 M/UL (ref 4.2–5.9)
SODIUM BLD-SCNC: 136 MMOL/L (ref 136–145)
SPECIFIC GRAVITY UA: 1.02 (ref 1–1.03)
TOTAL PROTEIN: 6.4 G/DL (ref 6.4–8.2)
URINE REFLEX TO CULTURE: NORMAL
URINE TYPE: NORMAL
UROBILINOGEN, URINE: 0.2 E.U./DL
VALPROIC ACID LEVEL: <2.8 UG/ML (ref 50–100)
WBC # BLD: 5.9 K/UL (ref 4–11)

## 2023-02-12 PROCEDURE — 81003 URINALYSIS AUTO W/O SCOPE: CPT

## 2023-02-12 PROCEDURE — 93005 ELECTROCARDIOGRAM TRACING: CPT | Performed by: EMERGENCY MEDICINE

## 2023-02-12 PROCEDURE — 2580000003 HC RX 258: Performed by: EMERGENCY MEDICINE

## 2023-02-12 PROCEDURE — 80053 COMPREHEN METABOLIC PANEL: CPT

## 2023-02-12 PROCEDURE — 80164 ASSAY DIPROPYLACETIC ACD TOT: CPT

## 2023-02-12 PROCEDURE — 96365 THER/PROPH/DIAG IV INF INIT: CPT

## 2023-02-12 PROCEDURE — 85025 COMPLETE CBC W/AUTO DIFF WBC: CPT

## 2023-02-12 PROCEDURE — 36415 COLL VENOUS BLD VENIPUNCTURE: CPT

## 2023-02-12 PROCEDURE — 6360000002 HC RX W HCPCS: Performed by: EMERGENCY MEDICINE

## 2023-02-12 PROCEDURE — 71046 X-RAY EXAM CHEST 2 VIEWS: CPT

## 2023-02-12 PROCEDURE — 99285 EMERGENCY DEPT VISIT HI MDM: CPT

## 2023-02-12 RX ORDER — SODIUM CHLORIDE, SODIUM LACTATE, POTASSIUM CHLORIDE, AND CALCIUM CHLORIDE .6; .31; .03; .02 G/100ML; G/100ML; G/100ML; G/100ML
1000 INJECTION, SOLUTION INTRAVENOUS ONCE
Status: COMPLETED | OUTPATIENT
Start: 2023-02-12 | End: 2023-02-12

## 2023-02-12 RX ADMIN — LEVETIRACETAM 2000 MG: 100 INJECTION INTRAVENOUS at 12:40

## 2023-02-12 RX ADMIN — SODIUM CHLORIDE, SODIUM LACTATE, POTASSIUM CHLORIDE, AND CALCIUM CHLORIDE 1000 ML: .6; .31; .03; .02 INJECTION, SOLUTION INTRAVENOUS at 11:32

## 2023-02-12 NOTE — ED NOTES
Seizure precautions in place. Side rails padded on both sides of stretcher. Oxygen and suction available at head of bed. Will continue seizure precautions for patient safety.       Delano Romero RN  02/12/23 1126

## 2023-02-12 NOTE — ED PROVIDER NOTES
[address] ENCOUNTER          ATTENDING PHYSICIAN NOTE       Date of evaluation: 2/12/2023    Chief Complaint     Seizure      History of Present Illness     Asa Abreu is a 20 y.o. 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 This was unwitnessed. He was found down on the ground. He has a scrape to the back of his right hand, otherwise no traumatic injuries noted. He is brought in by EMS. He does not remember what happened. He last had a seizure approximately 2 months ago. He has not seen his neurologist in 6 months. He reports taking his medication, but he does not know what meds he is on. He has seizures when he is stressed out, tired. He has been feeling stressed recently. He lives with his grandmother. He denies any other complaints. He is eating and drinking normally. Voiding normally and having normal bowel movements. No headache, vision changes, chest pain, shortness of breath, abdominal pain, nausea, vomiting, diarrhea, constipation, fever, chills, urinary symptoms. Review of Systems     As documented in HPI, otherwise all other systems were reviewed and negative    Past Medical, Surgical, Family, and Social History     He has a past medical history of ADHD (attention deficit hyperactivity disorder), Cognitive disorder, COVID, Depression, Oppositional defiant disorder, Seizures ([address] and Symptomatic localization-related epilepsy ([facility] [address] He has a past surgical history that includes Tonsillectomy. His family history is not on file. He reports that he has quit smoking. He has never used smokeless tobacco. He reports that he does not currently use drugs after having used the following drugs: Marijuana Cristy Nic). He reports that he does not drink alcohol.     Medications Previous Medications    ARIPIPRAZOLE (ABILIFY) 10 MG TABLET    Take 10 mg by mouth daily    ARIPIPRAZOLE (ABILIFY) 10 MG TABLET    Take 10 mg by mouth daily    CHOLECALCIFEROL (VITAMIN D3) 1000 UNITS TABS    Take 1 tablet by mouth 2 times daily    DIAZEPAM (DIASTAT ADULT RE)    Place 12.5 mg rectally For seizures lasting longer than 5 minutes or more than 1 seizure in 8 hours. DIVALPROEX (DEPAKOTE ER) 500 MG EXTENDED RELEASE TABLET    Take 4 tablets by mouth every morning    DIVALPROEX (DEPAKOTE ER) 500 MG EXTENDED RELEASE TABLET    Take 500 mg by mouth 2 times daily    IBUPROFEN (ADVIL;MOTRIN) 800 MG TABLET    Take 1 tablet by mouth every 8 hours as needed for Pain    LEVETIRACETAM (KEPPRA XR) 500 MG TB24 EXTENDED RELEASE TABLET    Take 500 mg by mouth daily    LEVETIRACETAM (KEPPRA) 100 MG/ML SOLUTION        ONDANSETRON (ZOFRAN) 4 MG TABLET    Take 1 tablet by mouth every 8 hours as needed for Nausea    OXCARBAZEPINE (TRILEPTAL) 300 MG TABLET    Take 2 tablets by mouth 2 times daily    OXCARBAZEPINE (TRILEPTAL) 300 MG TABLET    Take 300 mg by mouth 2 times daily    PALIPERIDONE (INVEGA) 3 MG EXTENDED RELEASE TABLET    Take 1 tablet by mouth nightly       Allergies     He is allergic to methadone, metadate er [methylphenidate], and methylphenidate hcl. Physical Exam     INITIAL VITALS: BP: 120/79, Temp: 98.2 °F (36.8 °C), Heart Rate: 79, Resp: 18, SpO2: 100 %   General: Well-appearing, no acute distress  HEENT: head is atraumatic, pupils equal round and reactive to light, sclera are clear, oropharynx is nonerythematous  Neck: Supple, no lymphadenopathy  Chest: Nonlabored respirations, equal chest rise bilaterally, no accessory muscle use  Cardiovascular: Normal rate, regular rhythm, 2+ radial pulses bilaterally  Abdominal: Soft, nontender, nondistended, no rebound or guarding  Back: No CVA tenderness bilaterally  Skin: Warm, dry, well-perfused, no rashes.   Small abrasion to the dorsum of the right hand.  Musculoskeletal: No obvious deformities, no tenderness to palpation diffusely  Neurologic: Alert and oriented x4, speech is clear and intact without dysarthria. Cranial nerves II through XII are intact. Finger-to-nose, rapid alternating movements, heel-to-shin are intact. Muscle strength is 4/5 in the left ankle, and left upper extremity. Otherwise 5/5 in the bilateral lower and the right upper extremity. Sensation normal in the bilateral upper and lower extremities. Romberg negative. Psych: Appropriate mood and affect  Diagnostic Results     EKG   Twelve-lead EKG performed on 2/12/2023 at 1117 hrs. Sinus rhythm at a rate of 78. Left axis deviation. Good R wave progression. No significant Q waves noted. 1 mm ST elevation in V2. T wave inversion in lead III and V3. QTc prolonged normal at 430. QRS normal at 98. [place] interval normal at 146. No STEMI. T wave inversion in V3 is new, but he has had flattened T waves there in the past.  EKG otherwise consistent with prior EKGs. RADIOLOGY:  XR CHEST (2 VW)   Final Result   1. No acute disease.           LABS:   Results for orders placed or performed during the hospital encounter of 02/12/23   CBC with Auto Differential   Result Value Ref Range    WBC 5.9 4.0 - 11.0 K/uL    RBC 4.88 4.20 - 5.90 M/uL    Hemoglobin 13.6 13.5 - 17.5 g/dL    Hematocrit 41.0 40.5 - 52.5 %    MCV 84.1 80.0 - 100.0 fL    MCH 27.8 26.0 - 34.0 pg    MCHC 33.0 31.0 - 36.0 g/dL    RDW 15.2 12.4 - 15.4 %    Platelets 326 363 - 744 K/uL    MPV 7.7 5.0 - 10.5 fL    Neutrophils % 79.1 %    Lymphocytes % 14.7 %    Monocytes % 5.9 %    Eosinophils % 0.0 %    Basophils % 0.3 %    Neutrophils Absolute 4.6 1.7 - 7.7 K/uL    Lymphocytes Absolute 0.9 (L) 1.0 - 5.1 K/uL    Monocytes Absolute 0.3 0.0 - 1.3 K/uL    Eosinophils Absolute 0.0 0.0 - 0.6 K/uL    Basophils Absolute 0.0 0.0 - 0.2 K/uL   CMP w/ Reflex to MG   Result Value Ref Range    Sodium 136 136 - 145 mmol/L    Potassium reflex Magnesium 4.4 3.5 - 5.1 mmol/L    Chloride 101 99 - 110 mmol/L    CO2 26 21 - 32 mmol/L    Anion Gap 9 3 - 16    Glucose 92 70 - 99 mg/dL    BUN 14 7 - 20 mg/dL    Creatinine 0.9 0.9 - 1.3 mg/dL    Est, Glom Filt Rate >60 >60    Calcium 9.6 8.3 - 10.6 mg/dL    Total Protein 6.4 6.4 - 8.2 g/dL    Albumin 3.9 3.4 - 5.0 g/dL    Albumin/Globulin Ratio 1.6 1.1 - 2.2    Total Bilirubin 0.3 0.0 - 1.0 mg/dL    Alkaline Phosphatase 72 40 - 129 U/L    ALT 15 10 - 40 U/L    AST 25 15 - 37 U/L   Valproic Acid Level, Total   Result Value Ref Range    Valproic Acid Lvl <2.8 (L) 50.0 - 100.0 ug/mL   Urinalysis with Reflex to Culture    Specimen: Urine   Result Value Ref Range    Color, UA Yellow Straw/Yellow    Clarity, UA Clear Clear    Glucose, Ur Negative Negative mg/dL    Bilirubin Urine Negative Negative    Ketones, Urine Negative Negative mg/dL    Specific Gravity, UA 1.020 1.005 - 1.030    Blood, Urine Negative Negative    pH, UA 6.5 5.0 - 8.0    Protein, UA Negative Negative mg/dL    Urobilinogen, Urine 0.2 <2.0 E.U./dL    Nitrite, Urine Negative Negative    Leukocyte Esterase, Urine Negative Negative    Microscopic Examination Not Indicated     Urine Type Other     Urine Reflex to Culture Not Indicated        ED BEDSIDE ULTRASOUND:  No results found. RECENT VITALS:  BP: 120/79,Temp: 98.2 °F (36.8 °C), Heart Rate: 79, Resp: 18, SpO2: 100 %     Procedures     N/a    ED Course     Nursing Notes, Past Medical Hx, Past Surgical Hx, Social Hx,Allergies, and Family Hx were reviewed.          patient was given the following medications:  Orders Placed This Encounter   Medications    lactated ringers bolus    levETIRAcetam (KEPPRA) 2,000 mg in sodium chloride 0.9 % 250 mL IVPB     Home dose       CONSULTS:  None    MEDICAL DECISIONMAKING / ASSESSMENT / Fre[place]  is a 20 y.o. male with a past medical history of seizures, ADHD, depression, oppositional defiant disorder, reported CVA at 6 years old with mild left-sided deficits who presents to the emergency department today after having a reported seizure. He arrives to the emergency department his ABCs intact. His vital signs are within normal limits. On exam, he has a small abrasion to the dorsum of the right hand. He has residual weakness in his left arm and leg due to his prior CVA. The remainder of his neuro exam is reassuring and appears to be at his baseline. EKG is negative for STEMI or arrhythmia. Chest x-ray shows no acute cardiopulmonary abnormalities. Labs are all reassuring. His valproic acid level is undetectable, he is not sure what medications he takes. Per chart review, it looks like he did not take it the last time he was here. He was given a dose of Keppra here in the emergency department. On repeat evaluation, there were no changes in his symptoms. He continued to be awake. I had a long discussion with him regarding contacting his  to get follow-up appointments with his primary care physician and his neurologist.  He is agreeable to this. He did state that he had not taken his seizure medicine today, but he had last taken it 2 days ago. I have recommended that he take this daily. He can always return to the emergency department immediately with any new or worsening symptoms. I discussed all of this with the patient who verbalized understanding and agreement to the plan. He is discharged from the emergency department in stable condition. Amount and/or Complexity of Data Reviewed  Labs: ordered. Decision-making details documented in ED Course. Radiology: ordered. Decision-making details documented in ED Course. ECG/medicine tests: ordered and independent interpretation performed. Decision-making details documented in ED Course. Risk  Decision regarding hospitalization. Reports a stroke when he was 4years old with left-sided deficits  Clinical Impression     1.  Seizure ([facility] [address]        Disposition PATIENT REFERRED TO:  Petr Giang [address]  [facility]  [phone]    Schedule an appointment as soon as possible for a visit         Please contact your  to get a follow-up appointment with your primary care physician and your neurologist        DISCHARGE MEDICATIONS:  New Prescriptions    No medications on file       DISPOSITION Decision To Discharge 02/12/2023 01:07:46 PM         Meme Arreola MD  02/12/23 1934

## 2023-02-12 NOTE — ED NOTES
Patient prepared for and ready to be discharged. Patient discharged at this time in no acute distress after verbalizing understanding of discharge instructions. Patient left after receiving After Visit Summary instructions. Pt to waiting room to wait for his ride to arrive.      Jayde Garg RN  02/12/23 2182

## 2023-02-12 NOTE — DISCHARGE INSTRUCTIONS
Today, you were evaluated for your seizures. All of your blood work was reassuring. We gave you a dose of your seizure medicine here today. Please continue to take your seizure medication at home. Please contact her  to get a follow-up appointment with your primary care physician and your neurologist.  Tangela Warner can always return to the emergency department immediately with any new or worsening symptoms.

## 2023-02-13 LAB
EKG ATRIAL RATE: 78 BPM
EKG DIAGNOSIS: NORMAL
EKG P AXIS: 28 DEGREES
EKG P-R INTERVAL: 146 MS
EKG Q-T INTERVAL: 378 MS
EKG QRS DURATION: 98 MS
EKG QTC CALCULATION (BAZETT): 430 MS
EKG R AXIS: 8 DEGREES
EKG T AXIS: -7 DEGREES
EKG VENTRICULAR RATE: 78 BPM

## 2023-11-29 ENCOUNTER — HOSPITAL ENCOUNTER (EMERGENCY)
Age: 23
Discharge: HOME | End: 2023-11-29
Attending: STUDENT IN AN ORGANIZED HEALTH CARE EDUCATION/TRAINING PROGRAM
Payer: COMMERCIAL

## 2023-11-29 ENCOUNTER — APPOINTMENT (OUTPATIENT)
Dept: GENERAL RADIOLOGY | Age: 23
End: 2023-11-29
Payer: COMMERCIAL

## 2023-11-29 VITALS
TEMPERATURE: 97.4 F | HEART RATE: 70 BPM | WEIGHT: 175.1 LBS | BODY MASS INDEX: 28.14 KG/M2 | RESPIRATION RATE: 17 BRPM | OXYGEN SATURATION: 100 % | HEIGHT: 66 IN | DIASTOLIC BLOOD PRESSURE: 76 MMHG | SYSTOLIC BLOOD PRESSURE: 115 MMHG

## 2023-11-29 DIAGNOSIS — R56.9 SEIZURE (HCC): Primary | ICD-10-CM

## 2023-11-29 LAB
AMMONIA PLAS-SCNC: 26 UMOL/L (ref 16–60)
ANION GAP SERPL CALCULATED.3IONS-SCNC: 13 MMOL/L (ref 3–16)
BACTERIA URNS QL MICRO: ABNORMAL /HPF
BASOPHILS # BLD: 0 K/UL (ref 0–0.2)
BASOPHILS NFR BLD: 0.2 %
BILIRUB UR QL STRIP.AUTO: NEGATIVE
BUN SERPL-MCNC: 12 MG/DL (ref 7–20)
CALCIUM SERPL-MCNC: 10.3 MG/DL (ref 8.3–10.6)
CHLORIDE SERPL-SCNC: 101 MMOL/L (ref 99–110)
CLARITY UR: CLEAR
CO2 SERPL-SCNC: 22 MMOL/L (ref 21–32)
COLOR UR: YELLOW
CREAT SERPL-MCNC: 0.9 MG/DL (ref 0.9–1.3)
DEPRECATED RDW RBC AUTO: 14.8 % (ref 12.4–15.4)
EKG ATRIAL RATE: 75 BPM
EKG DIAGNOSIS: NORMAL
EKG P AXIS: -7 DEGREES
EKG P-R INTERVAL: 142 MS
EKG Q-T INTERVAL: 396 MS
EKG QRS DURATION: 124 MS
EKG QTC CALCULATION (BAZETT): 442 MS
EKG R AXIS: 8 DEGREES
EKG T AXIS: -7 DEGREES
EKG VENTRICULAR RATE: 75 BPM
EOSINOPHIL # BLD: 0 K/UL (ref 0–0.6)
EOSINOPHIL NFR BLD: 0 %
EPI CELLS #/AREA URNS HPF: ABNORMAL /HPF (ref 0–5)
GFR SERPLBLD CREATININE-BSD FMLA CKD-EPI: >60 ML/MIN/{1.73_M2}
GLUCOSE BLD-MCNC: 114 MG/DL (ref 70–99)
GLUCOSE SERPL-MCNC: 62 MG/DL (ref 70–99)
GLUCOSE UR STRIP.AUTO-MCNC: NEGATIVE MG/DL
HCT VFR BLD AUTO: 47.5 % (ref 40.5–52.5)
HGB BLD-MCNC: 15.9 G/DL (ref 13.5–17.5)
HGB UR QL STRIP.AUTO: NEGATIVE
HYALINE CASTS #/AREA URNS LPF: ABNORMAL /LPF (ref 0–2)
KETONES UR STRIP.AUTO-MCNC: NEGATIVE MG/DL
LEUKOCYTE ESTERASE UR QL STRIP.AUTO: NEGATIVE
LYMPHOCYTES # BLD: 1 K/UL (ref 1–5.1)
LYMPHOCYTES NFR BLD: 24.2 %
MCH RBC QN AUTO: 28.1 PG (ref 26–34)
MCHC RBC AUTO-ENTMCNC: 33.4 G/DL (ref 31–36)
MCV RBC AUTO: 84.1 FL (ref 80–100)
MONOCYTES # BLD: 0.1 K/UL (ref 0–1.3)
MONOCYTES NFR BLD: 3.1 %
MUCOUS THREADS #/AREA URNS LPF: ABNORMAL /LPF
NEUTROPHILS # BLD: 3 K/UL (ref 1.7–7.7)
NEUTROPHILS NFR BLD: 72.5 %
NITRITE UR QL STRIP.AUTO: NEGATIVE
PERFORMED ON: ABNORMAL
PH UR STRIP.AUTO: 6.5 [PH] (ref 5–8)
PLATELET # BLD AUTO: 332 K/UL (ref 135–450)
PMV BLD AUTO: 8.1 FL (ref 5–10.5)
POTASSIUM SERPL-SCNC: 4.3 MMOL/L (ref 3.5–5.1)
PROT UR STRIP.AUTO-MCNC: 100 MG/DL
RBC # BLD AUTO: 5.64 M/UL (ref 4.2–5.9)
RBC #/AREA URNS HPF: ABNORMAL /HPF (ref 0–4)
SODIUM SERPL-SCNC: 136 MMOL/L (ref 136–145)
SP GR UR STRIP.AUTO: 1.02 (ref 1–1.03)
UA COMPLETE W REFLEX CULTURE PNL UR: ABNORMAL
UA DIPSTICK W REFLEX MICRO PNL UR: YES
URN SPEC COLLECT METH UR: ABNORMAL
UROBILINOGEN UR STRIP-ACNC: 0.2 E.U./DL
VALPROATE SERPL-MCNC: <2.8 UG/ML (ref 50–100)
WBC # BLD AUTO: 4.1 K/UL (ref 4–11)
WBC #/AREA URNS HPF: ABNORMAL /HPF (ref 0–5)

## 2023-11-29 PROCEDURE — 80164 ASSAY DIPROPYLACETIC ACD TOT: CPT

## 2023-11-29 PROCEDURE — 6370000000 HC RX 637 (ALT 250 FOR IP): Performed by: STUDENT IN AN ORGANIZED HEALTH CARE EDUCATION/TRAINING PROGRAM

## 2023-11-29 PROCEDURE — 6360000002 HC RX W HCPCS: Performed by: STUDENT IN AN ORGANIZED HEALTH CARE EDUCATION/TRAINING PROGRAM

## 2023-11-29 PROCEDURE — 93005 ELECTROCARDIOGRAM TRACING: CPT | Performed by: STUDENT IN AN ORGANIZED HEALTH CARE EDUCATION/TRAINING PROGRAM

## 2023-11-29 PROCEDURE — 80048 BASIC METABOLIC PNL TOTAL CA: CPT

## 2023-11-29 PROCEDURE — 96374 THER/PROPH/DIAG INJ IV PUSH: CPT

## 2023-11-29 PROCEDURE — 81001 URINALYSIS AUTO W/SCOPE: CPT

## 2023-11-29 PROCEDURE — 80177 DRUG SCRN QUAN LEVETIRACETAM: CPT

## 2023-11-29 PROCEDURE — 99285 EMERGENCY DEPT VISIT HI MDM: CPT

## 2023-11-29 PROCEDURE — 71045 X-RAY EXAM CHEST 1 VIEW: CPT

## 2023-11-29 PROCEDURE — 85025 COMPLETE CBC W/AUTO DIFF WBC: CPT

## 2023-11-29 PROCEDURE — 82140 ASSAY OF AMMONIA: CPT

## 2023-11-29 PROCEDURE — 80183 DRUG SCRN QUANT OXCARBAZEPIN: CPT

## 2023-11-29 RX ORDER — LEVETIRACETAM 500 MG/5ML
1000 INJECTION, SOLUTION, CONCENTRATE INTRAVENOUS ONCE
Status: COMPLETED | OUTPATIENT
Start: 2023-11-29 | End: 2023-11-29

## 2023-11-29 RX ORDER — ACETAMINOPHEN 325 MG/1
650 TABLET ORAL ONCE
Status: COMPLETED | OUTPATIENT
Start: 2023-11-29 | End: 2023-11-29

## 2023-11-29 RX ADMIN — LEVETIRACETAM 1000 MG: 100 INJECTION INTRAVENOUS at 14:41

## 2023-11-29 RX ADMIN — ACETAMINOPHEN 650 MG: 325 TABLET ORAL at 14:05

## 2023-11-29 NOTE — ED PROVIDER NOTES
bruising    -Musculoskeletal:  joint swelling, joint redness    Past Medical, Surgical, Family, and Social History     He has a past medical history of ADHD (attention deficit hyperactivity disorder), Cognitive disorder, COVID, Depression, Oppositional defiant disorder, Seizures ([address]), and Symptomatic localization-related epilepsy ([address]). He has a past surgical history that includes Tonsillectomy. His family history is not on file. He reports that he has quit smoking. He has never used smokeless tobacco. He reports that he does not currently use drugs after having used the following drugs: Marijuana Alfredo Mater). He reports that he does not drink alcohol.     Medications     Discharge Medication List as of 11/29/2023  2:48 PM        CONTINUE these medications which have NOT CHANGED    Details   levETIRAcetam (KEPPRA) 100 MG/ML solution Historical Med      levETIRAcetam (KEPPRA XR) 500 MG TB24 extended release tablet Take 500 mg by mouth dailyHistorical Med      ondansetron (ZOFRAN) 4 MG tablet Take 1 tablet by mouth every 8 hours as needed for Nausea, Disp-12 tablet,R-0Print      !! divalproex (DEPAKOTE ER) 500 MG extended release tablet Take 500 mg by mouth 2 times dailyHistorical Med      !! OXcarbazepine (TRILEPTAL) 300 MG tablet Take 300 mg by mouth 2 times dailyHistorical Med      ARIPiprazole (ABILIFY) 10 MG tablet Take 10 mg by mouth dailyHistorical Med      ibuprofen (ADVIL;MOTRIN) 800 MG tablet Take 1 tablet by mouth every 8 hours as needed for Pain, Disp-30 tablet, R-0Print      paliperidone (INVEGA) 3 MG extended release tablet Take 1 tablet by mouth nightlyHistorical Med      Cholecalciferol (VITAMIN D3) 1000 units TABS Take 1 tablet by mouth 2 times dailyHistorical Med      !! divalproex (DEPAKOTE ER) 500 MG extended release tablet Take 4 tablets by mouth every morningHistorical Med      !! OXcarbazepine (TRILEPTAL) 300 MG tablet Take 2 tablets by mouth 2 times dailyHistorical Med      diazePAM (DIASTAT

## 2023-11-29 NOTE — DISCHARGE INSTRUCTIONS
We saw you in the hospital for seizure. Blood, urine tests were reassuring. Chest xray was reassuring. You were treated with tylenol and Keppra. TAKE ALL OF YOUR MEDICATIONS (including keppra) WHEN YOU GO HOME TODAY    You need to see your regular doctor in 2-3 days to be checked. CALL DR VORA'S OFFICE TO SCHEDULE A SOONER AND MORE CONVENIENT APPOINTMENT IF POSSIBLE    You should return to the emergency department if your symptoms worsen or do not resolve. In addition, return if:  - You have a fever (greater than 101 degrees)  - You have chest pain, shortness of breath, abdominal pain, or uncontrollable vomiting  - You are unable to eat or drink  - You pass out  - You have difficulty moving your arms or legs   - You have difficulty speaking or slurred speech  - Or you have any concern that you feel needs acute physician evaluation.

## 2023-11-29 NOTE — ED NOTES
Seizure pads placed on side rails. Oxygen and suction equipment available at bedside. Pt on bedside cardiac monitor.       Sarath Arredondo RN  11/29/23 8006

## 2023-11-30 LAB
LEVETIRACETAM SERPL-MCNC: <2 UG/ML (ref 6–46)
MEDICATION DOSE-MCNC: ABNORMAL

## 2023-12-04 LAB — 10OH-CARBAZEPINE SERPL-MCNC: <1 UG/ML (ref 3–35)

## 2023-12-14 ENCOUNTER — HOSPITAL ENCOUNTER (INPATIENT)
Age: 23
LOS: 1 days | Discharge: HOME | DRG: 811 | End: 2023-12-15
Attending: EMERGENCY MEDICINE | Admitting: FAMILY MEDICINE
Payer: COMMERCIAL

## 2023-12-14 DIAGNOSIS — T78.3XXA ANGIOEDEMA, INITIAL ENCOUNTER: Primary | ICD-10-CM

## 2023-12-14 LAB
ALBUMIN SERPL-MCNC: 4.2 G/DL (ref 3.4–5)
ALBUMIN/GLOB SERPL: 1.4 {RATIO} (ref 1.1–2.2)
ALP SERPL-CCNC: 64 U/L (ref 40–129)
ALT SERPL-CCNC: 43 U/L (ref 10–40)
ANION GAP SERPL CALCULATED.3IONS-SCNC: 12 MMOL/L (ref 3–16)
AST SERPL-CCNC: 46 U/L (ref 15–37)
BASOPHILS # BLD: 0 K/UL (ref 0–0.2)
BASOPHILS NFR BLD: 0.4 %
BILIRUB SERPL-MCNC: 0.5 MG/DL (ref 0–1)
BUN SERPL-MCNC: 21 MG/DL (ref 7–20)
CALCIUM SERPL-MCNC: 9.3 MG/DL (ref 8.3–10.6)
CHLORIDE SERPL-SCNC: 102 MMOL/L (ref 99–110)
CO2 SERPL-SCNC: 24 MMOL/L (ref 21–32)
CREAT SERPL-MCNC: 0.9 MG/DL (ref 0.9–1.3)
DEPRECATED RDW RBC AUTO: 14.9 % (ref 12.4–15.4)
EOSINOPHIL # BLD: 0 K/UL (ref 0–0.6)
EOSINOPHIL NFR BLD: 0 %
ERYTHROCYTE [SEDIMENTATION RATE] IN BLOOD BY WESTERGREN METHOD: 14 MM/HR (ref 0–15)
GFR SERPLBLD CREATININE-BSD FMLA CKD-EPI: >60 ML/MIN/{1.73_M2}
GLUCOSE SERPL-MCNC: 79 MG/DL (ref 70–99)
HCT VFR BLD AUTO: 48 % (ref 40.5–52.5)
HGB BLD-MCNC: 16.1 G/DL (ref 13.5–17.5)
LYMPHOCYTES # BLD: 0.5 K/UL (ref 1–5.1)
LYMPHOCYTES NFR BLD: 9.4 %
MCH RBC QN AUTO: 28 PG (ref 26–34)
MCHC RBC AUTO-ENTMCNC: 33.6 G/DL (ref 31–36)
MCV RBC AUTO: 83.5 FL (ref 80–100)
MONOCYTES # BLD: 0.2 K/UL (ref 0–1.3)
MONOCYTES NFR BLD: 3.4 %
NEUTROPHILS # BLD: 4.6 K/UL (ref 1.7–7.7)
NEUTROPHILS NFR BLD: 86.8 %
PLATELET # BLD AUTO: 300 K/UL (ref 135–450)
PMV BLD AUTO: 8.6 FL (ref 5–10.5)
POTASSIUM SERPL-SCNC: 4.4 MMOL/L (ref 3.5–5.1)
PROT SERPL-MCNC: 7.1 G/DL (ref 6.4–8.2)
RBC # BLD AUTO: 5.75 M/UL (ref 4.2–5.9)
SODIUM SERPL-SCNC: 138 MMOL/L (ref 136–145)
WBC # BLD AUTO: 5.3 K/UL (ref 4–11)

## 2023-12-14 PROCEDURE — 2580000003 HC RX 258: Performed by: FAMILY MEDICINE

## 2023-12-14 PROCEDURE — 2500000003 HC RX 250 WO HCPCS: Performed by: FAMILY MEDICINE

## 2023-12-14 PROCEDURE — 80053 COMPREHEN METABOLIC PANEL: CPT

## 2023-12-14 PROCEDURE — 96374 THER/PROPH/DIAG INJ IV PUSH: CPT

## 2023-12-14 PROCEDURE — A4216 STERILE WATER/SALINE, 10 ML: HCPCS | Performed by: FAMILY MEDICINE

## 2023-12-14 PROCEDURE — 2580000003 HC RX 258: Performed by: EMERGENCY MEDICINE

## 2023-12-14 PROCEDURE — 6360000002 HC RX W HCPCS: Performed by: FAMILY MEDICINE

## 2023-12-14 PROCEDURE — 2500000003 HC RX 250 WO HCPCS: Performed by: EMERGENCY MEDICINE

## 2023-12-14 PROCEDURE — 85025 COMPLETE CBC W/AUTO DIFF WBC: CPT

## 2023-12-14 PROCEDURE — 6360000002 HC RX W HCPCS: Performed by: EMERGENCY MEDICINE

## 2023-12-14 PROCEDURE — 86140 C-REACTIVE PROTEIN: CPT

## 2023-12-14 PROCEDURE — 99223 1ST HOSP IP/OBS HIGH 75: CPT | Performed by: FAMILY MEDICINE

## 2023-12-14 PROCEDURE — 2060000000 HC ICU INTERMEDIATE R&B

## 2023-12-14 PROCEDURE — 86160 COMPLEMENT ANTIGEN: CPT

## 2023-12-14 PROCEDURE — A4216 STERILE WATER/SALINE, 10 ML: HCPCS | Performed by: EMERGENCY MEDICINE

## 2023-12-14 PROCEDURE — 6370000000 HC RX 637 (ALT 250 FOR IP): Performed by: FAMILY MEDICINE

## 2023-12-14 PROCEDURE — 99285 EMERGENCY DEPT VISIT HI MDM: CPT

## 2023-12-14 PROCEDURE — 96375 TX/PRO/DX INJ NEW DRUG ADDON: CPT

## 2023-12-14 PROCEDURE — 85652 RBC SED RATE AUTOMATED: CPT

## 2023-12-14 RX ORDER — SODIUM CHLORIDE 0.9 % (FLUSH) 0.9 %
5-40 SYRINGE (ML) INJECTION EVERY 12 HOURS SCHEDULED
Status: DISCONTINUED | OUTPATIENT
Start: 2023-12-14 | End: 2023-12-15 | Stop reason: HOSPADM

## 2023-12-14 RX ORDER — ACETAMINOPHEN 325 MG/1
650 TABLET ORAL EVERY 6 HOURS PRN
Status: DISCONTINUED | OUTPATIENT
Start: 2023-12-14 | End: 2023-12-15 | Stop reason: HOSPADM

## 2023-12-14 RX ORDER — LEVETIRACETAM 100 MG/ML
500 SOLUTION ORAL DAILY
Status: DISCONTINUED | OUTPATIENT
Start: 2023-12-14 | End: 2023-12-15 | Stop reason: HOSPADM

## 2023-12-14 RX ORDER — DIPHENHYDRAMINE HYDROCHLORIDE 50 MG/ML
25 INJECTION INTRAMUSCULAR; INTRAVENOUS ONCE
Status: COMPLETED | OUTPATIENT
Start: 2023-12-14 | End: 2023-12-14

## 2023-12-14 RX ORDER — PALIPERIDONE 3 MG/1
3 TABLET, EXTENDED RELEASE ORAL NIGHTLY
Status: DISCONTINUED | OUTPATIENT
Start: 2023-12-14 | End: 2023-12-15 | Stop reason: HOSPADM

## 2023-12-14 RX ORDER — SODIUM CHLORIDE 9 MG/ML
INJECTION, SOLUTION INTRAVENOUS PRN
Status: DISCONTINUED | OUTPATIENT
Start: 2023-12-14 | End: 2023-12-15 | Stop reason: HOSPADM

## 2023-12-14 RX ORDER — POTASSIUM CHLORIDE 20 MEQ/1
40 TABLET, EXTENDED RELEASE ORAL PRN
Status: DISCONTINUED | OUTPATIENT
Start: 2023-12-14 | End: 2023-12-15 | Stop reason: HOSPADM

## 2023-12-14 RX ORDER — POTASSIUM CHLORIDE 7.45 MG/ML
10 INJECTION INTRAVENOUS PRN
Status: DISCONTINUED | OUTPATIENT
Start: 2023-12-14 | End: 2023-12-15 | Stop reason: HOSPADM

## 2023-12-14 RX ORDER — OXCARBAZEPINE 300 MG/5ML
SUSPENSION ORAL 2 TIMES DAILY
COMMUNITY
Start: 2023-12-04

## 2023-12-14 RX ORDER — VITAMIN B COMPLEX
1 TABLET ORAL 2 TIMES DAILY
Status: DISCONTINUED | OUTPATIENT
Start: 2023-12-14 | End: 2023-12-15 | Stop reason: HOSPADM

## 2023-12-14 RX ORDER — ONDANSETRON 2 MG/ML
4 INJECTION INTRAMUSCULAR; INTRAVENOUS EVERY 6 HOURS PRN
Status: DISCONTINUED | OUTPATIENT
Start: 2023-12-14 | End: 2023-12-15 | Stop reason: HOSPADM

## 2023-12-14 RX ORDER — DIVALPROEX SODIUM 500 MG/1
2000 TABLET, EXTENDED RELEASE ORAL EVERY MORNING
Status: DISCONTINUED | OUTPATIENT
Start: 2023-12-15 | End: 2023-12-15 | Stop reason: HOSPADM

## 2023-12-14 RX ORDER — SODIUM CHLORIDE 0.9 % (FLUSH) 0.9 %
5-40 SYRINGE (ML) INJECTION PRN
Status: DISCONTINUED | OUTPATIENT
Start: 2023-12-14 | End: 2023-12-15 | Stop reason: HOSPADM

## 2023-12-14 RX ORDER — DIVALPROEX SODIUM 500 MG/1
500 TABLET, EXTENDED RELEASE ORAL
Status: DISCONTINUED | OUTPATIENT
Start: 2023-12-14 | End: 2023-12-15

## 2023-12-14 RX ORDER — ONDANSETRON 4 MG/1
4 TABLET, ORALLY DISINTEGRATING ORAL EVERY 8 HOURS PRN
Status: DISCONTINUED | OUTPATIENT
Start: 2023-12-14 | End: 2023-12-15 | Stop reason: HOSPADM

## 2023-12-14 RX ORDER — LEVETIRACETAM 500 MG/1
500 TABLET ORAL DAILY
Status: DISCONTINUED | OUTPATIENT
Start: 2023-12-14 | End: 2023-12-14 | Stop reason: ALTCHOICE

## 2023-12-14 RX ORDER — ENOXAPARIN SODIUM 100 MG/ML
40 INJECTION SUBCUTANEOUS DAILY
Status: DISCONTINUED | OUTPATIENT
Start: 2023-12-14 | End: 2023-12-15 | Stop reason: HOSPADM

## 2023-12-14 RX ORDER — DIPHENHYDRAMINE HYDROCHLORIDE 50 MG/ML
25 INJECTION INTRAMUSCULAR; INTRAVENOUS EVERY 6 HOURS
Status: DISCONTINUED | OUTPATIENT
Start: 2023-12-14 | End: 2023-12-15

## 2023-12-14 RX ORDER — MAGNESIUM SULFATE IN WATER 40 MG/ML
2000 INJECTION, SOLUTION INTRAVENOUS PRN
Status: DISCONTINUED | OUTPATIENT
Start: 2023-12-14 | End: 2023-12-15 | Stop reason: HOSPADM

## 2023-12-14 RX ORDER — OXCARBAZEPINE 300 MG/1
300 TABLET, FILM COATED ORAL 2 TIMES DAILY
Status: DISCONTINUED | OUTPATIENT
Start: 2023-12-14 | End: 2023-12-15 | Stop reason: HOSPADM

## 2023-12-14 RX ORDER — ACETAMINOPHEN 650 MG/1
650 SUPPOSITORY RECTAL EVERY 6 HOURS PRN
Status: DISCONTINUED | OUTPATIENT
Start: 2023-12-14 | End: 2023-12-15 | Stop reason: HOSPADM

## 2023-12-14 RX ORDER — SODIUM CHLORIDE 9 MG/ML
INJECTION, SOLUTION INTRAVENOUS CONTINUOUS
Status: DISCONTINUED | OUTPATIENT
Start: 2023-12-14 | End: 2023-12-15 | Stop reason: HOSPADM

## 2023-12-14 RX ADMIN — OXCARBAZEPINE 300 MG: 300 TABLET, FILM COATED ORAL at 23:52

## 2023-12-14 RX ADMIN — SODIUM CHLORIDE: 9 INJECTION, SOLUTION INTRAVENOUS at 22:50

## 2023-12-14 RX ADMIN — DIVALPROEX SODIUM 500 MG: 500 TABLET, EXTENDED RELEASE ORAL at 22:37

## 2023-12-14 RX ADMIN — DIPHENHYDRAMINE HYDROCHLORIDE 25 MG: 50 INJECTION, SOLUTION INTRAMUSCULAR; INTRAVENOUS at 22:37

## 2023-12-14 RX ADMIN — METHYLPREDNISOLONE SODIUM SUCCINATE 80 MG: 125 INJECTION, POWDER, LYOPHILIZED, FOR SOLUTION INTRAMUSCULAR; INTRAVENOUS at 22:37

## 2023-12-14 RX ADMIN — Medication 1000 UNITS: at 22:37

## 2023-12-14 RX ADMIN — LEVETIRACETAM 500 MG: 500 SOLUTION ORAL at 23:52

## 2023-12-14 RX ADMIN — FAMOTIDINE 20 MG: 10 INJECTION, SOLUTION INTRAVENOUS at 16:20

## 2023-12-14 RX ADMIN — SODIUM CHLORIDE, PRESERVATIVE FREE 10 ML: 5 INJECTION INTRAVENOUS at 23:54

## 2023-12-14 RX ADMIN — FAMOTIDINE 20 MG: 10 INJECTION, SOLUTION INTRAVENOUS at 22:36

## 2023-12-14 RX ADMIN — DIPHENHYDRAMINE HYDROCHLORIDE 25 MG: 50 INJECTION INTRAMUSCULAR; INTRAVENOUS at 16:19

## 2023-12-14 RX ADMIN — WATER 125 MG: 1 INJECTION INTRAMUSCULAR; INTRAVENOUS; SUBCUTANEOUS at 16:17

## 2023-12-14 RX ADMIN — ENOXAPARIN SODIUM 40 MG: 100 INJECTION SUBCUTANEOUS at 22:35

## 2023-12-14 RX ADMIN — PALIPERIDONE 3 MG: 3 TABLET, EXTENDED RELEASE ORAL at 23:52

## 2023-12-15 ENCOUNTER — APPOINTMENT (OUTPATIENT)
Dept: GENERAL RADIOLOGY | Age: 23
DRG: 811 | End: 2023-12-15
Payer: COMMERCIAL

## 2023-12-15 VITALS
WEIGHT: 159.61 LBS | OXYGEN SATURATION: 97 % | DIASTOLIC BLOOD PRESSURE: 65 MMHG | HEIGHT: 66 IN | TEMPERATURE: 97.5 F | RESPIRATION RATE: 17 BRPM | BODY MASS INDEX: 25.65 KG/M2 | HEART RATE: 77 BPM | SYSTOLIC BLOOD PRESSURE: 119 MMHG

## 2023-12-15 LAB
ALBUMIN SERPL-MCNC: 3.9 G/DL (ref 3.4–5)
ALBUMIN/GLOB SERPL: 1.5 {RATIO} (ref 1.1–2.2)
ALP SERPL-CCNC: 60 U/L (ref 40–129)
ALT SERPL-CCNC: 45 U/L (ref 10–40)
ANION GAP SERPL CALCULATED.3IONS-SCNC: 11 MMOL/L (ref 3–16)
AST SERPL-CCNC: 41 U/L (ref 15–37)
BASOPHILS # BLD: 0 K/UL (ref 0–0.2)
BASOPHILS NFR BLD: 0 %
BILIRUB SERPL-MCNC: 0.4 MG/DL (ref 0–1)
BUN SERPL-MCNC: 20 MG/DL (ref 7–20)
C4 SERPL-MCNC: 28.6 MG/DL (ref 10–40)
CALCIUM SERPL-MCNC: 9.2 MG/DL (ref 8.3–10.6)
CHLORIDE SERPL-SCNC: 102 MMOL/L (ref 99–110)
CO2 SERPL-SCNC: 23 MMOL/L (ref 21–32)
CREAT SERPL-MCNC: 0.9 MG/DL (ref 0.9–1.3)
CRP SERPL-MCNC: 25.4 MG/L (ref 0–5.1)
DEPRECATED RDW RBC AUTO: 14.8 % (ref 12.4–15.4)
EOSINOPHIL # BLD: 0 K/UL (ref 0–0.6)
EOSINOPHIL NFR BLD: 0 %
GFR SERPLBLD CREATININE-BSD FMLA CKD-EPI: >60 ML/MIN/{1.73_M2}
GLUCOSE SERPL-MCNC: 124 MG/DL (ref 70–99)
HCT VFR BLD AUTO: 41.6 % (ref 40.5–52.5)
HGB BLD-MCNC: 14.2 G/DL (ref 13.5–17.5)
LYMPHOCYTES # BLD: 0.3 K/UL (ref 1–5.1)
LYMPHOCYTES NFR BLD: 4.4 %
MCH RBC QN AUTO: 28.6 PG (ref 26–34)
MCHC RBC AUTO-ENTMCNC: 34.2 G/DL (ref 31–36)
MCV RBC AUTO: 83.6 FL (ref 80–100)
MONOCYTES # BLD: 0.1 K/UL (ref 0–1.3)
MONOCYTES NFR BLD: 0.9 %
NEUTROPHILS # BLD: 6.8 K/UL (ref 1.7–7.7)
NEUTROPHILS NFR BLD: 94.7 %
PLATELET # BLD AUTO: 330 K/UL (ref 135–450)
PMV BLD AUTO: 8.3 FL (ref 5–10.5)
POTASSIUM SERPL-SCNC: 4.3 MMOL/L (ref 3.5–5.1)
PROT SERPL-MCNC: 6.5 G/DL (ref 6.4–8.2)
RBC # BLD AUTO: 4.98 M/UL (ref 4.2–5.9)
SODIUM SERPL-SCNC: 136 MMOL/L (ref 136–145)
WBC # BLD AUTO: 7.1 K/UL (ref 4–11)

## 2023-12-15 PROCEDURE — 36415 COLL VENOUS BLD VENIPUNCTURE: CPT

## 2023-12-15 PROCEDURE — 2500000003 HC RX 250 WO HCPCS: Performed by: FAMILY MEDICINE

## 2023-12-15 PROCEDURE — 6370000000 HC RX 637 (ALT 250 FOR IP): Performed by: FAMILY MEDICINE

## 2023-12-15 PROCEDURE — 2580000003 HC RX 258: Performed by: HOSPITALIST

## 2023-12-15 PROCEDURE — A4216 STERILE WATER/SALINE, 10 ML: HCPCS | Performed by: HOSPITALIST

## 2023-12-15 PROCEDURE — 6370000000 HC RX 637 (ALT 250 FOR IP): Performed by: HOSPITALIST

## 2023-12-15 PROCEDURE — 2580000003 HC RX 258: Performed by: FAMILY MEDICINE

## 2023-12-15 PROCEDURE — 6360000002 HC RX W HCPCS: Performed by: HOSPITALIST

## 2023-12-15 PROCEDURE — 6360000002 HC RX W HCPCS: Performed by: FAMILY MEDICINE

## 2023-12-15 PROCEDURE — C9113 INJ PANTOPRAZOLE SODIUM, VIA: HCPCS | Performed by: HOSPITALIST

## 2023-12-15 PROCEDURE — 80053 COMPREHEN METABOLIC PANEL: CPT

## 2023-12-15 PROCEDURE — 85025 COMPLETE CBC W/AUTO DIFF WBC: CPT

## 2023-12-15 PROCEDURE — 74018 RADEX ABDOMEN 1 VIEW: CPT

## 2023-12-15 PROCEDURE — A4216 STERILE WATER/SALINE, 10 ML: HCPCS | Performed by: FAMILY MEDICINE

## 2023-12-15 RX ORDER — CETIRIZINE HYDROCHLORIDE 10 MG/1
10 TABLET ORAL DAILY
Qty: 7 TABLET | Refills: 0 | Status: SHIPPED | OUTPATIENT
Start: 2023-12-16

## 2023-12-15 RX ORDER — CETIRIZINE HYDROCHLORIDE 10 MG/1
10 TABLET ORAL DAILY
Status: DISCONTINUED | OUTPATIENT
Start: 2023-12-15 | End: 2023-12-15 | Stop reason: HOSPADM

## 2023-12-15 RX ORDER — LEVETIRACETAM 100 MG/ML
100 SOLUTION ORAL NIGHTLY
Status: DISCONTINUED | OUTPATIENT
Start: 2023-12-15 | End: 2023-12-15

## 2023-12-15 RX ORDER — MAGNESIUM HYDROXIDE/ALUMINUM HYDROXICE/SIMETHICONE 120; 1200; 1200 MG/30ML; MG/30ML; MG/30ML
30 SUSPENSION ORAL EVERY 6 HOURS PRN
Status: DISCONTINUED | OUTPATIENT
Start: 2023-12-15 | End: 2023-12-15 | Stop reason: HOSPADM

## 2023-12-15 RX ORDER — DIVALPROEX SODIUM 500 MG/1
500 TABLET, EXTENDED RELEASE ORAL 2 TIMES DAILY
Status: DISCONTINUED | OUTPATIENT
Start: 2023-12-15 | End: 2023-12-15 | Stop reason: HOSPADM

## 2023-12-15 RX ORDER — PREDNISONE 20 MG/1
40 TABLET ORAL DAILY
Qty: 8 TABLET | Refills: 0 | Status: SHIPPED | OUTPATIENT
Start: 2023-12-15 | End: 2023-12-19

## 2023-12-15 RX ORDER — DIPHENHYDRAMINE HCL 25 MG
50 TABLET ORAL ONCE
Status: COMPLETED | OUTPATIENT
Start: 2023-12-15 | End: 2023-12-15

## 2023-12-15 RX ORDER — DIPHENHYDRAMINE HCL 25 MG
25 TABLET ORAL EVERY 8 HOURS PRN
Qty: 10 TABLET | Refills: 0 | Status: SHIPPED | OUTPATIENT
Start: 2023-12-15 | End: 2023-12-25

## 2023-12-15 RX ORDER — DIPHENHYDRAMINE HCL 25 MG
25 TABLET ORAL NIGHTLY
Status: DISCONTINUED | OUTPATIENT
Start: 2023-12-15 | End: 2023-12-15 | Stop reason: HOSPADM

## 2023-12-15 RX ORDER — PREDNISONE 20 MG/1
40 TABLET ORAL DAILY
Status: DISCONTINUED | OUTPATIENT
Start: 2023-12-15 | End: 2023-12-15 | Stop reason: HOSPADM

## 2023-12-15 RX ADMIN — CETIRIZINE HYDROCHLORIDE 10 MG: 10 TABLET, FILM COATED ORAL at 10:04

## 2023-12-15 RX ADMIN — DIPHENHYDRAMINE HYDROCHLORIDE 50 MG: 25 TABLET ORAL at 10:03

## 2023-12-15 RX ADMIN — SODIUM CHLORIDE: 9 INJECTION, SOLUTION INTRAVENOUS at 07:17

## 2023-12-15 RX ADMIN — OXCARBAZEPINE 300 MG: 300 TABLET, FILM COATED ORAL at 10:04

## 2023-12-15 RX ADMIN — SODIUM CHLORIDE 40 MG: 9 INJECTION INTRAMUSCULAR; INTRAVENOUS; SUBCUTANEOUS at 10:02

## 2023-12-15 RX ADMIN — FAMOTIDINE 20 MG: 10 INJECTION, SOLUTION INTRAVENOUS at 10:02

## 2023-12-15 RX ADMIN — DIPHENHYDRAMINE HYDROCHLORIDE 25 MG: 50 INJECTION, SOLUTION INTRAMUSCULAR; INTRAVENOUS at 03:06

## 2023-12-15 RX ADMIN — Medication 1000 UNITS: at 10:01

## 2023-12-15 RX ADMIN — LEVETIRACETAM 500 MG: 500 SOLUTION ORAL at 10:04

## 2023-12-15 RX ADMIN — ENOXAPARIN SODIUM 40 MG: 100 INJECTION SUBCUTANEOUS at 10:03

## 2023-12-15 NOTE — PROGRESS NOTES
V2.[place]    [facility] [place]      Name:  Светлана Del Rio /Age/Sex: 2000  (20 y.o. 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 Pt is not a great historian. He told the ED he was started on a new seizure medication the night before his swelling. It looks like the new medication is a vitamin B, possibly B12. He told he started it a few days ago. He stated this was started by his neurologist. He had a seizures on . No other new medications and no prior episodes like this. He denies family hx of this. He has been complaining of intermittent abd pain that he stated he had for a long time. Swelling did not involve his tongue or throat and he had no SOB or difficult breathing     Angioedema of the lower lip   -C4 level is normal. Seems allergic reaction to the new medications   -Cont supportive care with steroids and antihistamines.   -Stop his B supplements - waiting on family to bring and confirm his medications   -Likely DC later today if he tolerated 2 meals    ADDENDUM: his sister just arrived. She stated she gave him his medications at 5.30 AM. It included Vit D3 2000 which is new and it was his first dose. At 1.30 when she woke up he complained of lower lip swelling. No prior hx or family hx.this is likely an allergic reaction to the vit D.  Will add to his allergy list. Plan to discharge today    Seizures disorder with recent seizures  ADHD  Depression  Oppositional defiant disorder  Reported CVA at 4years old with mild left-sided deficits  -Cont other home meds         Personally reviewed Lab Studies and

## 2023-12-15 NOTE — PROGRESS NOTES
4 Eyes Skin Assessment     NAME:  Patrick Talbot  YOB: 2000  MEDICAL RECORD NUMBER:  [id number]    The patient is being assessed for  Admission    I agree that at least one RN has performed a thorough Head to Toe Skin Assessment on the patient. ALL assessment sites listed below have been assessed. Areas assessed by both nurses:    Head, Face, Ears, Shoulders, Back, Chest, Arms, Elbows, Hands, Sacrum. Buttock, Coccyx, Ischium, Legs. Feet and Heels, and Under Medical Devices         Does the Patient have a Wound?  No noted wound(s)       Sammy Prevention initiated by RN: No  Wound Care Orders initiated by RN: No    Pressure Injury (Stage 3,4, Unstageable, DTI, NWPT, and Complex wounds) if present, place Wound referral order by RN under : No    New Ostomies, if present place, Ostomy referral order under : No     Nurse 1 eSignature: Electronically signed by Jeremiah Beltran RN on 12/15/23 at 6:08 AM EST    **SHARE this note so that the co-signing nurse can place an eSignature**    Nurse 2 eSignature: Electronically signed by Sylvia Rosenbaum RN on 93/35/61 at 9:07 PM EST          s

## 2023-12-15 NOTE — PLAN OF CARE
Problem: Discharge Planning  Goal: Discharge to home or other facility with appropriate resources  12/15/2023 1313 by Neeru Zapata RN  Outcome: Completed  12/15/2023 0403 by Rush Hernadez RN  Outcome: Progressing  Flowsheets (Taken 12/15/2023 0403)  Discharge to home or other facility with appropriate resources:   Identify barriers to discharge with patient and caregiver   Arrange for needed discharge resources and transportation as appropriate     Problem: Neurosensory - Adult  Goal: Achieves stable or improved neurological status  Outcome: Completed  Goal: Absence of seizures  12/15/2023 1313 by Neeru Zapata RN  Outcome: Completed  12/15/2023 0403 by Rush Hernadez RN  Outcome: Progressing  Flowsheets (Taken 12/15/2023 0403)  Absence of seizures:   Monitor for seizure activity. If seizure occurs, document type and location of movements and any associated apnea   If seizure occurs, turn head to side and suction secretions as needed   Support airway/breathing, administer oxygen as needed  Note: No seizure activity noted this shift. Scheduled home medication given per order. Seizure precautions in place  Goal: Remains free of injury related to seizures activity  Outcome: Completed  Goal: Achieves maximal functionality and self care  Outcome: Completed     Problem: Safety - Adult  Goal: Free from fall injury  12/15/2023 1313 by Neeru Zapata RN  Outcome: Completed  Flowsheets (Taken 12/15/2023 0743)  Free From Fall Injury:   Cindy Crowell family/caregiver on patient safety   Based on caregiver fall risk screen, instruct family/caregiver to ask for assistance with transferring infant if caregiver noted to have fall risk factors  12/15/2023 0403 by Rush Hernadez RN  Outcome: Progressing  Note: Pt is a Fall Risk. See Raquel Peel Fall Risk Score. Pt bed in low position and side rails up and bed alarm on. Call light and belongings in reach. Pt encouraged to call for assistance.  Will continue with hourly rounds for PO

## 2023-12-15 NOTE — ADT AUTH CERT
Subscriber Details  Hospital Account [de-identified]  [facility] Subscriber Name/Sex/Relation Subscriber  Subscriber Address/Phone Subscriber Emp/Emp Phone   1José Phillip   [id number] Tiffanie Tijerina - Male   (Self) 2000 [address], [address]   [phone](F) NOT EMPLOYED      Utilization Reviews       Physician advisor inpt letter by Nelly Moise RN  Last Updated by Nelly Moise RN on 12/15/2023 1437     Review Status Created By   In Primary Nelly Moise RN       Review Type   --      Criteria Review   We recommend that the following pt's current hospitalization under Inpatient status is APPROPRIATE . Name: Tiffanie Tijerina   : 2000   CSN: [id number]   [facility]    Rationale-- Due to intensity of services, this patient meets IP criteria    Clinical summary   Admitted for angioedema- a life threatening condition  iv solumedrol. IV benadryl   BP soft  Continuous pulse ox  Labs and Imaging reviewed  Status decision based on clinical judgment and Commercial Utilization criteria, e.g., [employer], [employer]   Comments due to medical necessity, this patient is appropriate for IP           Initial by Nelly Moise RN  Last Updated by Nelly Moise RN on 12/15/2023 1253     Review Status Created By   In Primary Nelly Moise RN       Review Type   --      Criteria Review   DATE:      TYPE OF BED: [facility]        CHIEF COMPLAINT/ PRESENTATION:  Oral swelling     HPI:       Significant lower lip swelling. States he started a new seizure medication this morning at 0500. Woke up around 1400 with the swelling. Denies difficulty breathing or swallowing, itching or hives.          VITALS:  T: 36.7 p: 80 rr: 16 bp: 128/97 spo2: 99% ra     ABNL/PERTINENT LABS:  crp: 25.4     RADIOLOGY/DIAGNOSTIC STUDIES:  KUB:    Impression:       Nonspecific and nonobstructive bowel gas pattern         ED TREATMENT:  Benadryl 25mg iv x 1, benadryl 50mg po x 1,

## 2023-12-15 NOTE — PLAN OF CARE
Problem: Neurosensory - Adult  Goal: Absence of seizures  Outcome: Progressing  Flowsheets (Taken 12/15/2023 0403)  Absence of seizures:   Monitor for seizure activity. If seizure occurs, document type and location of movements and any associated apnea   If seizure occurs, turn head to side and suction secretions as needed   Support airway/breathing, administer oxygen as needed  Note: No seizure activity noted this shift. Scheduled home medication given per order. Seizure precautions in place     Problem: Discharge Planning  Goal: Discharge to home or other facility with appropriate resources  Outcome: Progressing  Flowsheets (Taken 12/15/2023 0403)  Discharge to home or other facility with appropriate resources:   Identify barriers to discharge with patient and caregiver   Arrange for needed discharge resources and transportation as appropriate     Problem: Safety - Adult  Goal: Free from fall injury  Outcome: Progressing  Note: Pt is a Fall Risk. See Foster Ramal Fall Risk Score. Pt bed in low position and side rails up and bed alarm on. Call light and belongings in reach. Pt encouraged to call for assistance. Will continue with hourly rounds for PO intake, pain needs, toileting, and repositioning as needed.

## 2023-12-15 NOTE — DISCHARGE SUMMARY
V2.0  Discharge Summary    Name:  Jemima Hawthorne /Age/Sex: 2000 (20 y.o. male)   Admit Date: 2023  Discharge Date: 12/15/23    MRN & CSN:  [id number] & [id number] Encounter Date and Time 12/15/23 2:07 PM EST    Attending:  No att. 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 His sister gave him his medications at 5.30 AM. It included Vit D3 2000 which is new and it was his first dose. At 1.30 when she woke up he complained of lower lip swelling. No prior hx or family hx. C4 level is normal. this is likely an allergic reaction to the vit D. Will add to his allergy list. He was started on steroid and antihistamines with good improvement. He was able to tolerate multiple meals in the hospital with no issues. He was discharged home in stable conditions      Brief Problem Based Course:   Angioedema of the lower lip likely 2/2 new vit D supplements    Chronic problems   Seizures disorder with recent seizures  ADHD  Depression  Oppositional defiant disorder  Reported CVA at 4years old with mild left-sided deficits      The patient expressed appropriate understanding of, and agreement with the discharge recommendations, medications, and plan.      Consults this admission:  IP CONSULT TO PULMONOLOGY    Discharge Diagnosis:   Angioedema, initial encounter        Discharge Instruction:   Follow up appointments: PCP  Primary care physician: Melita Mujica within 2 weeks  Diet: regular diet   Activity: activity as tolerated  Disposition: Discharged to:   [x]Home, [][facility], []SNF, []Acute Rehab, []Hospice   Condition on discharge: Stable  Labs and Tests to be Followed up as an outpatient by PCP or Specialist:     Discharge Medications:        Medication

## 2025-08-27 ENCOUNTER — APPOINTMENT (OUTPATIENT)
Dept: CT IMAGING | Age: 25
End: 2025-08-27
Payer: COMMERCIAL

## 2025-08-27 ENCOUNTER — HOSPITAL ENCOUNTER (INPATIENT)
Age: 25
LOS: 1 days | Discharge: ANOTHER HOSPITAL | End: 2025-08-28
Attending: STUDENT IN AN ORGANIZED HEALTH CARE EDUCATION/TRAINING PROGRAM
Payer: COMMERCIAL

## 2025-08-27 PROBLEM — R56.9 SEIZURE (HCC): Status: ACTIVE | Noted: 2025-08-27

## 2025-08-27 LAB
ALBUMIN SERPL-MCNC: 4 G/DL (ref 3.4–5)
ALBUMIN/GLOB SERPL: 1.5 {RATIO} (ref 1.1–2.2)
ALP SERPL-CCNC: 72 U/L (ref 40–129)
ALT SERPL-CCNC: 19 U/L (ref 10–40)
ANION GAP SERPL CALCULATED.3IONS-SCNC: 10 MMOL/L (ref 3–16)
AST SERPL-CCNC: 30 U/L (ref 15–37)
BASOPHILS # BLD: 0 K/UL (ref 0–0.2)
BASOPHILS NFR BLD: 0.4 %
BILIRUB SERPL-MCNC: 0.3 MG/DL (ref 0–1)
BUN SERPL-MCNC: 18 MG/DL (ref 7–20)
CALCIUM SERPL-MCNC: 9.2 MG/DL (ref 8.3–10.6)
CHLORIDE SERPL-SCNC: 102 MMOL/L (ref 99–110)
CO2 SERPL-SCNC: 24 MMOL/L (ref 21–32)
CREAT SERPL-MCNC: 0.9 MG/DL (ref 0.9–1.3)
DEPRECATED RDW RBC AUTO: 14.8 % (ref 12.4–15.4)
EKG ATRIAL RATE: 63 BPM
EKG DIAGNOSIS: NORMAL
EKG P AXIS: -5 DEGREES
EKG P-R INTERVAL: 158 MS
EKG Q-T INTERVAL: 430 MS
EKG QRS DURATION: 108 MS
EKG QTC CALCULATION (BAZETT): 440 MS
EKG R AXIS: 7 DEGREES
EKG T AXIS: -11 DEGREES
EKG VENTRICULAR RATE: 63 BPM
EOSINOPHIL # BLD: 0 K/UL (ref 0–0.6)
EOSINOPHIL NFR BLD: 0.1 %
GFR SERPLBLD CREATININE-BSD FMLA CKD-EPI: >90 ML/MIN/{1.73_M2}
GLUCOSE SERPL-MCNC: 105 MG/DL (ref 70–99)
HCT VFR BLD AUTO: 39.9 % (ref 40.5–52.5)
HGB BLD-MCNC: 13.8 G/DL (ref 13.5–17.5)
LEVETIRACETAM SERPL-MCNC: <2 UG/ML (ref 6–46)
LYMPHOCYTES # BLD: 0.9 K/UL (ref 1–5.1)
LYMPHOCYTES NFR BLD: 30.8 %
MCH RBC QN AUTO: 28.9 PG (ref 26–34)
MCHC RBC AUTO-ENTMCNC: 34.5 G/DL (ref 31–36)
MCV RBC AUTO: 83.7 FL (ref 80–100)
MEDICATION DOSE-MCNC: ABNORMAL
MONOCYTES # BLD: 0.2 K/UL (ref 0–1.3)
MONOCYTES NFR BLD: 8.3 %
NEUTROPHILS # BLD: 1.7 K/UL (ref 1.7–7.7)
NEUTROPHILS NFR BLD: 60.4 %
PLATELET # BLD AUTO: 261 K/UL (ref 135–450)
PMV BLD AUTO: 8 FL (ref 5–10.5)
POTASSIUM SERPL-SCNC: 4.1 MMOL/L (ref 3.5–5.1)
PROT SERPL-MCNC: 6.7 G/DL (ref 6.4–8.2)
RBC # BLD AUTO: 4.77 M/UL (ref 4.2–5.9)
SARS-COV-2 RDRP RESP QL NAA+PROBE: NOT DETECTED
SODIUM SERPL-SCNC: 136 MMOL/L (ref 136–145)
VALPROATE SERPL-MCNC: 3 UG/ML (ref 50–100)
WBC # BLD AUTO: 2.9 K/UL (ref 4–11)

## 2025-08-27 PROCEDURE — 96374 THER/PROPH/DIAG INJ IV PUSH: CPT

## 2025-08-27 PROCEDURE — 6360000002 HC RX W HCPCS

## 2025-08-27 PROCEDURE — 99285 EMERGENCY DEPT VISIT HI MDM: CPT

## 2025-08-27 PROCEDURE — 80053 COMPREHEN METABOLIC PANEL: CPT

## 2025-08-27 PROCEDURE — 87635 SARS-COV-2 COVID-19 AMP PRB: CPT

## 2025-08-27 PROCEDURE — 36415 COLL VENOUS BLD VENIPUNCTURE: CPT

## 2025-08-27 PROCEDURE — 6360000002 HC RX W HCPCS: Performed by: STUDENT IN AN ORGANIZED HEALTH CARE EDUCATION/TRAINING PROGRAM

## 2025-08-27 PROCEDURE — 85025 COMPLETE CBC W/AUTO DIFF WBC: CPT

## 2025-08-27 PROCEDURE — 93010 ELECTROCARDIOGRAM REPORT: CPT | Performed by: INTERNAL MEDICINE

## 2025-08-27 PROCEDURE — 70450 CT HEAD/BRAIN W/O DYE: CPT

## 2025-08-27 PROCEDURE — 6370000000 HC RX 637 (ALT 250 FOR IP)

## 2025-08-27 PROCEDURE — 2500000003 HC RX 250 WO HCPCS

## 2025-08-27 PROCEDURE — 93005 ELECTROCARDIOGRAM TRACING: CPT | Performed by: STUDENT IN AN ORGANIZED HEALTH CARE EDUCATION/TRAINING PROGRAM

## 2025-08-27 PROCEDURE — 1200000000 HC SEMI PRIVATE

## 2025-08-27 PROCEDURE — 94760 N-INVAS EAR/PLS OXIMETRY 1: CPT

## 2025-08-27 PROCEDURE — 80183 DRUG SCRN QUANT OXCARBAZEPIN: CPT

## 2025-08-27 PROCEDURE — 80164 ASSAY DIPROPYLACETIC ACD TOT: CPT

## 2025-08-27 PROCEDURE — 80177 DRUG SCRN QUAN LEVETIRACETAM: CPT

## 2025-08-27 RX ORDER — POTASSIUM CHLORIDE 7.45 MG/ML
10 INJECTION INTRAVENOUS PRN
Status: DISCONTINUED | OUTPATIENT
Start: 2025-08-27 | End: 2025-08-28 | Stop reason: HOSPADM

## 2025-08-27 RX ORDER — ACETAMINOPHEN 325 MG/1
650 TABLET ORAL EVERY 6 HOURS PRN
Status: DISCONTINUED | OUTPATIENT
Start: 2025-08-27 | End: 2025-08-28 | Stop reason: HOSPADM

## 2025-08-27 RX ORDER — SODIUM CHLORIDE 0.9 % (FLUSH) 0.9 %
5-40 SYRINGE (ML) INJECTION PRN
Status: DISCONTINUED | OUTPATIENT
Start: 2025-08-27 | End: 2025-08-28 | Stop reason: HOSPADM

## 2025-08-27 RX ORDER — SODIUM CHLORIDE 0.9 % (FLUSH) 0.9 %
5-40 SYRINGE (ML) INJECTION EVERY 12 HOURS SCHEDULED
Status: DISCONTINUED | OUTPATIENT
Start: 2025-08-27 | End: 2025-08-28 | Stop reason: HOSPADM

## 2025-08-27 RX ORDER — MIDAZOLAM HYDROCHLORIDE 1 MG/ML
2 INJECTION, SOLUTION INTRAMUSCULAR; INTRAVENOUS EVERY 12 HOURS PRN
Status: DISCONTINUED | OUTPATIENT
Start: 2025-08-27 | End: 2025-08-28 | Stop reason: HOSPADM

## 2025-08-27 RX ORDER — OXCARBAZEPINE 60 MG/ML
600 SUSPENSION ORAL 2 TIMES DAILY
Status: DISCONTINUED | OUTPATIENT
Start: 2025-08-27 | End: 2025-08-28 | Stop reason: HOSPADM

## 2025-08-27 RX ORDER — MAGNESIUM SULFATE IN WATER 40 MG/ML
2000 INJECTION, SOLUTION INTRAVENOUS PRN
Status: DISCONTINUED | OUTPATIENT
Start: 2025-08-27 | End: 2025-08-28 | Stop reason: HOSPADM

## 2025-08-27 RX ORDER — POLYETHYLENE GLYCOL 3350 17 G/17G
17 POWDER, FOR SOLUTION ORAL DAILY PRN
Status: DISCONTINUED | OUTPATIENT
Start: 2025-08-27 | End: 2025-08-28 | Stop reason: HOSPADM

## 2025-08-27 RX ORDER — ACETAMINOPHEN 650 MG/1
650 SUPPOSITORY RECTAL EVERY 6 HOURS PRN
Status: DISCONTINUED | OUTPATIENT
Start: 2025-08-27 | End: 2025-08-28 | Stop reason: HOSPADM

## 2025-08-27 RX ORDER — ONDANSETRON 4 MG/1
4 TABLET, ORALLY DISINTEGRATING ORAL EVERY 8 HOURS PRN
Status: DISCONTINUED | OUTPATIENT
Start: 2025-08-27 | End: 2025-08-28 | Stop reason: HOSPADM

## 2025-08-27 RX ORDER — CYANOCOBALAMIN 1000 UG/ML
1000 INJECTION, SOLUTION INTRAMUSCULAR; SUBCUTANEOUS
COMMUNITY

## 2025-08-27 RX ORDER — SODIUM CHLORIDE 9 MG/ML
INJECTION, SOLUTION INTRAVENOUS PRN
Status: DISCONTINUED | OUTPATIENT
Start: 2025-08-27 | End: 2025-08-28 | Stop reason: HOSPADM

## 2025-08-27 RX ORDER — LEVETIRACETAM 500 MG/5ML
1000 INJECTION, SOLUTION, CONCENTRATE INTRAVENOUS ONCE
Status: COMPLETED | OUTPATIENT
Start: 2025-08-27 | End: 2025-08-27

## 2025-08-27 RX ORDER — ENOXAPARIN SODIUM 100 MG/ML
40 INJECTION SUBCUTANEOUS DAILY
Status: DISCONTINUED | OUTPATIENT
Start: 2025-08-27 | End: 2025-08-28 | Stop reason: HOSPADM

## 2025-08-27 RX ORDER — ONDANSETRON 2 MG/ML
4 INJECTION INTRAMUSCULAR; INTRAVENOUS EVERY 6 HOURS PRN
Status: DISCONTINUED | OUTPATIENT
Start: 2025-08-27 | End: 2025-08-28 | Stop reason: HOSPADM

## 2025-08-27 RX ORDER — POTASSIUM CHLORIDE 1500 MG/1
40 TABLET, EXTENDED RELEASE ORAL PRN
Status: DISCONTINUED | OUTPATIENT
Start: 2025-08-27 | End: 2025-08-28 | Stop reason: HOSPADM

## 2025-08-27 RX ADMIN — LEVETIRACETAM 1000 MG: 100 INJECTION INTRAVENOUS at 13:27

## 2025-08-27 RX ADMIN — Medication 10 ML: at 21:30

## 2025-08-27 RX ADMIN — ENOXAPARIN SODIUM 40 MG: 100 INJECTION SUBCUTANEOUS at 17:59

## 2025-08-27 RX ADMIN — OXCARBAZEPINE 600 MG: 300 SUSPENSION ORAL at 21:29

## 2025-08-27 ASSESSMENT — PAIN - FUNCTIONAL ASSESSMENT
PAIN_FUNCTIONAL_ASSESSMENT: 0-10
PAIN_FUNCTIONAL_ASSESSMENT: 0-10

## 2025-08-27 ASSESSMENT — PAIN SCALES - GENERAL
PAINLEVEL_OUTOF10: 0

## 2025-08-27 ASSESSMENT — LIFESTYLE VARIABLES
HOW MANY STANDARD DRINKS CONTAINING ALCOHOL DO YOU HAVE ON A TYPICAL DAY: PATIENT DOES NOT DRINK
HOW OFTEN DO YOU HAVE A DRINK CONTAINING ALCOHOL: NEVER

## 2025-08-28 ENCOUNTER — APPOINTMENT (OUTPATIENT)
Dept: MRI IMAGING | Age: 25
End: 2025-08-28
Payer: COMMERCIAL

## 2025-08-28 VITALS
WEIGHT: 169.53 LBS | HEIGHT: 66 IN | HEART RATE: 51 BPM | TEMPERATURE: 97.7 F | SYSTOLIC BLOOD PRESSURE: 128 MMHG | OXYGEN SATURATION: 100 % | BODY MASS INDEX: 27.25 KG/M2 | DIASTOLIC BLOOD PRESSURE: 88 MMHG | RESPIRATION RATE: 16 BRPM

## 2025-08-28 LAB
ANION GAP SERPL CALCULATED.3IONS-SCNC: 7 MMOL/L (ref 3–16)
BASOPHILS # BLD: 0 K/UL (ref 0–0.2)
BASOPHILS NFR BLD: 0.3 %
BUN SERPL-MCNC: 18 MG/DL (ref 7–20)
CALCIUM SERPL-MCNC: 9.2 MG/DL (ref 8.3–10.6)
CHLORIDE SERPL-SCNC: 99 MMOL/L (ref 99–110)
CO2 SERPL-SCNC: 25 MMOL/L (ref 21–32)
CREAT SERPL-MCNC: 0.8 MG/DL (ref 0.9–1.3)
DEPRECATED RDW RBC AUTO: 15.3 % (ref 12.4–15.4)
EOSINOPHIL # BLD: 0 K/UL (ref 0–0.6)
EOSINOPHIL NFR BLD: 0.1 %
GFR SERPLBLD CREATININE-BSD FMLA CKD-EPI: >90 ML/MIN/{1.73_M2}
GLUCOSE SERPL-MCNC: 95 MG/DL (ref 70–99)
HCT VFR BLD AUTO: 41.3 % (ref 40.5–52.5)
HGB BLD-MCNC: 14 G/DL (ref 13.5–17.5)
LYMPHOCYTES # BLD: 1.4 K/UL (ref 1–5.1)
LYMPHOCYTES NFR BLD: 41.4 %
MCH RBC QN AUTO: 28.7 PG (ref 26–34)
MCHC RBC AUTO-ENTMCNC: 33.9 G/DL (ref 31–36)
MCV RBC AUTO: 84.6 FL (ref 80–100)
MONOCYTES # BLD: 0.2 K/UL (ref 0–1.3)
MONOCYTES NFR BLD: 6.1 %
NEUTROPHILS # BLD: 1.7 K/UL (ref 1.7–7.7)
NEUTROPHILS NFR BLD: 52.1 %
PLATELET # BLD AUTO: 284 K/UL (ref 135–450)
PMV BLD AUTO: 8.3 FL (ref 5–10.5)
POTASSIUM SERPL-SCNC: 3.9 MMOL/L (ref 3.5–5.1)
RBC # BLD AUTO: 4.88 M/UL (ref 4.2–5.9)
SODIUM SERPL-SCNC: 131 MMOL/L (ref 136–145)
WBC # BLD AUTO: 3.3 K/UL (ref 4–11)

## 2025-08-28 PROCEDURE — 85025 COMPLETE CBC W/AUTO DIFF WBC: CPT

## 2025-08-28 PROCEDURE — 6370000000 HC RX 637 (ALT 250 FOR IP)

## 2025-08-28 PROCEDURE — 2500000003 HC RX 250 WO HCPCS

## 2025-08-28 PROCEDURE — 6360000002 HC RX W HCPCS

## 2025-08-28 PROCEDURE — 94760 N-INVAS EAR/PLS OXIMETRY 1: CPT

## 2025-08-28 PROCEDURE — 70551 MRI BRAIN STEM W/O DYE: CPT

## 2025-08-28 PROCEDURE — 36415 COLL VENOUS BLD VENIPUNCTURE: CPT

## 2025-08-28 PROCEDURE — 80048 BASIC METABOLIC PNL TOTAL CA: CPT

## 2025-08-28 RX ADMIN — Medication 10 ML: at 09:18

## 2025-08-28 RX ADMIN — ENOXAPARIN SODIUM 40 MG: 100 INJECTION SUBCUTANEOUS at 09:14

## 2025-08-28 RX ADMIN — OXCARBAZEPINE 600 MG: 300 SUSPENSION ORAL at 09:14

## 2025-08-28 ASSESSMENT — PAIN SCALES - GENERAL: PAINLEVEL_OUTOF10: 2

## 2025-08-29 LAB — 10OH-CARBAZEPINE SERPL-MCNC: 17.9 UG/ML (ref 10–35)
